# Patient Record
Sex: FEMALE | Race: WHITE | NOT HISPANIC OR LATINO | Employment: OTHER | URBAN - METROPOLITAN AREA
[De-identification: names, ages, dates, MRNs, and addresses within clinical notes are randomized per-mention and may not be internally consistent; named-entity substitution may affect disease eponyms.]

---

## 2017-07-06 DIAGNOSIS — Z00.00 ROUTINE GENERAL MEDICAL EXAMINATION AT A HEALTH CARE FACILITY: Primary | ICD-10-CM

## 2017-09-17 ENCOUNTER — RESULTS ENCOUNTER (OUTPATIENT)
Dept: FAMILY MEDICINE CLINIC | Facility: CLINIC | Age: 67
End: 2017-09-17

## 2017-09-17 DIAGNOSIS — Z00.00 ROUTINE GENERAL MEDICAL EXAMINATION AT A HEALTH CARE FACILITY: ICD-10-CM

## 2017-09-23 LAB
ALBUMIN SERPL-MCNC: 4.3 G/DL (ref 3.6–4.8)
ALBUMIN/GLOB SERPL: 1.7 {RATIO} (ref 1.2–2.2)
ALP SERPL-CCNC: 144 IU/L (ref 39–117)
ALT SERPL-CCNC: 67 IU/L (ref 0–32)
AST SERPL-CCNC: 48 IU/L (ref 0–40)
BILIRUB SERPL-MCNC: 0.2 MG/DL (ref 0–1.2)
BUN SERPL-MCNC: 17 MG/DL (ref 8–27)
BUN/CREAT SERPL: 27 (ref 12–28)
CALCIUM SERPL-MCNC: 9.1 MG/DL (ref 8.7–10.3)
CHLORIDE SERPL-SCNC: 104 MMOL/L (ref 96–106)
CHOLEST SERPL-MCNC: 182 MG/DL (ref 100–199)
CO2 SERPL-SCNC: 22 MMOL/L (ref 18–29)
CREAT SERPL-MCNC: 0.62 MG/DL (ref 0.57–1)
ERYTHROCYTE [DISTWIDTH] IN BLOOD BY AUTOMATED COUNT: 13.4 % (ref 12.3–15.4)
GLOBULIN SER CALC-MCNC: 2.5 G/DL (ref 1.5–4.5)
GLUCOSE SERPL-MCNC: 108 MG/DL (ref 65–99)
HCT VFR BLD AUTO: 41.6 % (ref 34–46.6)
HDLC SERPL-MCNC: 49 MG/DL
HGB BLD-MCNC: 14.2 G/DL (ref 11.1–15.9)
LDLC SERPL CALC-MCNC: 92 MG/DL (ref 0–99)
MCH RBC QN AUTO: 33.4 PG (ref 26.6–33)
MCHC RBC AUTO-ENTMCNC: 34.1 G/DL (ref 31.5–35.7)
MCV RBC AUTO: 98 FL (ref 79–97)
PLATELET # BLD AUTO: 162 X10E3/UL (ref 150–379)
POTASSIUM SERPL-SCNC: 4.4 MMOL/L (ref 3.5–5.2)
PROT SERPL-MCNC: 6.8 G/DL (ref 6–8.5)
RBC # BLD AUTO: 4.25 X10E6/UL (ref 3.77–5.28)
SODIUM SERPL-SCNC: 144 MMOL/L (ref 134–144)
TRIGL SERPL-MCNC: 206 MG/DL (ref 0–149)
VLDLC SERPL CALC-MCNC: 41 MG/DL (ref 5–40)
WBC # BLD AUTO: 9.4 X10E3/UL (ref 3.4–10.8)

## 2017-09-28 ENCOUNTER — OFFICE VISIT (OUTPATIENT)
Dept: FAMILY MEDICINE CLINIC | Facility: CLINIC | Age: 67
End: 2017-09-28

## 2017-09-28 VITALS
DIASTOLIC BLOOD PRESSURE: 86 MMHG | SYSTOLIC BLOOD PRESSURE: 156 MMHG | WEIGHT: 164 LBS | HEART RATE: 100 BPM | HEIGHT: 66 IN | OXYGEN SATURATION: 97 % | BODY MASS INDEX: 26.36 KG/M2

## 2017-09-28 DIAGNOSIS — Z00.00 ROUTINE GENERAL MEDICAL EXAMINATION AT HEALTH CARE FACILITY: Primary | ICD-10-CM

## 2017-09-28 DIAGNOSIS — Z12.11 COLON CANCER SCREENING: ICD-10-CM

## 2017-09-28 DIAGNOSIS — Z23 FLU VACCINE NEED: ICD-10-CM

## 2017-09-28 DIAGNOSIS — M76.61 ACHILLES TENDINITIS OF BOTH LOWER EXTREMITIES: ICD-10-CM

## 2017-09-28 DIAGNOSIS — R74.8 ELEVATED LIVER ENZYMES: ICD-10-CM

## 2017-09-28 DIAGNOSIS — M76.62 ACHILLES TENDINITIS OF BOTH LOWER EXTREMITIES: ICD-10-CM

## 2017-09-28 DIAGNOSIS — IMO0001 ELEVATED BLOOD PRESSURE: ICD-10-CM

## 2017-09-28 PROCEDURE — 99397 PER PM REEVAL EST PAT 65+ YR: CPT | Performed by: INTERNAL MEDICINE

## 2017-09-28 PROCEDURE — 90662 IIV NO PRSV INCREASED AG IM: CPT | Performed by: INTERNAL MEDICINE

## 2017-09-28 PROCEDURE — 90472 IMMUNIZATION ADMIN EACH ADD: CPT | Performed by: INTERNAL MEDICINE

## 2017-09-28 PROCEDURE — 90471 IMMUNIZATION ADMIN: CPT | Performed by: INTERNAL MEDICINE

## 2017-09-28 PROCEDURE — 90732 PPSV23 VACC 2 YRS+ SUBQ/IM: CPT | Performed by: INTERNAL MEDICINE

## 2017-09-28 PROCEDURE — 99214 OFFICE O/P EST MOD 30 MIN: CPT | Performed by: INTERNAL MEDICINE

## 2017-10-11 ENCOUNTER — HOSPITAL ENCOUNTER (OUTPATIENT)
Dept: ULTRASOUND IMAGING | Facility: HOSPITAL | Age: 67
Discharge: HOME | End: 2017-10-11
Attending: INTERNAL MEDICINE | Admitting: INTERNAL MEDICINE

## 2017-10-11 PROCEDURE — 76705 ECHO EXAM OF ABDOMEN: CPT

## 2017-10-12 LAB
ALBUMIN SERPL-MCNC: 4.5 G/DL (ref 3.6–4.8)
ALBUMIN/GLOB SERPL: 1.7 {RATIO} (ref 1.2–2.2)
ALP SERPL-CCNC: 105 IU/L (ref 39–117)
ALT SERPL-CCNC: 46 IU/L (ref 0–32)
AST SERPL-CCNC: 25 IU/L (ref 0–40)
BILIRUB SERPL-MCNC: 0.3 MG/DL (ref 0–1.2)
BUN SERPL-MCNC: 15 MG/DL (ref 8–27)
BUN/CREAT SERPL: 22 (ref 12–28)
CALCIUM SERPL-MCNC: 9.5 MG/DL (ref 8.7–10.3)
CHLORIDE SERPL-SCNC: 104 MMOL/L (ref 96–106)
CO2 SERPL-SCNC: 22 MMOL/L (ref 18–29)
CREAT SERPL-MCNC: 0.69 MG/DL (ref 0.57–1)
FERRITIN SERPL-MCNC: 398 NG/ML (ref 15–150)
GGT SERPL-CCNC: 274 IU/L (ref 0–60)
GLOBULIN SER CALC-MCNC: 2.7 G/DL (ref 1.5–4.5)
GLUCOSE SERPL-MCNC: 114 MG/DL (ref 65–99)
HBV CORE AB SERPL QL IA: NEGATIVE
HBV SURFACE AB SER QL: NON REACTIVE
HCV AB S/CO SERPL IA: 0.1 S/CO RATIO (ref 0–0.9)
IRON SATN MFR SERPL: 23 % (ref 15–55)
IRON SERPL-MCNC: 75 UG/DL (ref 27–139)
LKM-1 AB SER-ACNC: <1 UNITS (ref 0–20)
POTASSIUM SERPL-SCNC: 4.8 MMOL/L (ref 3.5–5.2)
PROT SERPL-MCNC: 7.2 G/DL (ref 6–8.5)
SODIUM SERPL-SCNC: 144 MMOL/L (ref 134–144)
TIBC SERPL-MCNC: 321 UG/DL (ref 250–450)
UIBC SERPL-MCNC: 246 UG/DL (ref 118–369)

## 2017-11-16 ENCOUNTER — OFFICE VISIT (OUTPATIENT)
Dept: FAMILY MEDICINE CLINIC | Facility: CLINIC | Age: 67
End: 2017-11-16

## 2017-11-16 VITALS
HEIGHT: 66 IN | OXYGEN SATURATION: 95 % | DIASTOLIC BLOOD PRESSURE: 84 MMHG | WEIGHT: 160.6 LBS | HEART RATE: 83 BPM | SYSTOLIC BLOOD PRESSURE: 120 MMHG | BODY MASS INDEX: 25.81 KG/M2

## 2017-11-16 DIAGNOSIS — K76.0 NAFLD (NONALCOHOLIC FATTY LIVER DISEASE): Primary | ICD-10-CM

## 2017-11-16 PROCEDURE — 99212 OFFICE O/P EST SF 10 MIN: CPT | Performed by: INTERNAL MEDICINE

## 2017-11-16 NOTE — PROGRESS NOTES
"Subjective   Antonia Mendez is a 67 y.o. female who presents today for:    Hypertension (7 week f/u) and Elevated Hepatic Enzymes (F/U Labs)    History of Present Illness   Liver enzymes and BP were elevated 2 months ago.  She has embarked on diet changes and increased exercise since then and dropped a few pounds already.    Liver U/S showed fatty liver disease.      Ms. Mendez  reports that she has been smoking Cigarettes.  She has never used smokeless tobacco. She reports that she drinks about 0.6 oz of alcohol per week  She reports that she does not use illicit drugs.     No Known Allergies    Current Outpatient Prescriptions:   •  B Complex Vitamins (VITAMIN-B COMPLEX PO), Take 1 tablet by mouth Daily., Disp: , Rfl:   •  Cholecalciferol (VITAMIN D PO), Take  by mouth Daily., Disp: , Rfl:   •  doxycycline (VIBRAMYCIN) 100 MG capsule, Take 1 capsule by mouth daily. (Patient taking differently: Take 100 mg by mouth Every Other Day.), Disp: 90 capsule, Rfl: 3      Review of Systems   Constitutional: Negative for unexpected weight change.   Cardiovascular: Negative for chest pain and leg swelling.       Objective   Vitals:    11/16/17 1130   BP: 120/84   BP Location: Left arm   Patient Position: Sitting   Cuff Size: Adult   Pulse: 83   SpO2: 95%   Weight: 160 lb 9.6 oz (72.8 kg)   Height: 66\" (167.6 cm)     Physical Exam  Anicteric.  In Good spirits.    Assessment/Plan   Antonia was seen today for hypertension and elevated hepatic enzymes.    Diagnoses and all orders for this visit:    NAFLD (nonalcoholic fatty liver disease)    Continue the therapeutic lifestyle changes that have her wait, her blood pressure, and her liver enzymes trending in the right direction  we will recheck her labs in 6 months, unless she needs to see us sooner.  "

## 2018-05-02 DIAGNOSIS — K76.0 NAFLD (NONALCOHOLIC FATTY LIVER DISEASE): Primary | ICD-10-CM

## 2018-05-02 DIAGNOSIS — R73.01 IFG (IMPAIRED FASTING GLUCOSE): ICD-10-CM

## 2018-05-06 ENCOUNTER — RESULTS ENCOUNTER (OUTPATIENT)
Dept: FAMILY MEDICINE CLINIC | Facility: CLINIC | Age: 68
End: 2018-05-06

## 2018-05-06 DIAGNOSIS — R73.01 IFG (IMPAIRED FASTING GLUCOSE): ICD-10-CM

## 2018-05-06 DIAGNOSIS — K76.0 NAFLD (NONALCOHOLIC FATTY LIVER DISEASE): ICD-10-CM

## 2018-05-10 LAB
ALBUMIN SERPL-MCNC: 4.5 G/DL (ref 3.6–4.8)
ALBUMIN/GLOB SERPL: 1.8 {RATIO} (ref 1.2–2.2)
ALP SERPL-CCNC: 106 IU/L (ref 39–117)
ALT SERPL-CCNC: 20 IU/L (ref 0–32)
AST SERPL-CCNC: 16 IU/L (ref 0–40)
BILIRUB SERPL-MCNC: 0.3 MG/DL (ref 0–1.2)
BUN SERPL-MCNC: 13 MG/DL (ref 8–27)
BUN/CREAT SERPL: 16 (ref 12–28)
CALCIUM SERPL-MCNC: 9.9 MG/DL (ref 8.7–10.3)
CHLORIDE SERPL-SCNC: 102 MMOL/L (ref 96–106)
CHOLEST SERPL-MCNC: 228 MG/DL (ref 100–199)
CO2 SERPL-SCNC: 23 MMOL/L (ref 18–29)
CREAT SERPL-MCNC: 0.81 MG/DL (ref 0.57–1)
FERRITIN SERPL-MCNC: 328 NG/ML (ref 15–150)
GFR SERPLBLD CREATININE-BSD FMLA CKD-EPI: 75 ML/MIN/1.73
GFR SERPLBLD CREATININE-BSD FMLA CKD-EPI: 87 ML/MIN/1.73
GGT SERPL-CCNC: 113 IU/L (ref 0–60)
GLOBULIN SER CALC-MCNC: 2.5 G/DL (ref 1.5–4.5)
GLUCOSE SERPL-MCNC: 102 MG/DL (ref 65–99)
HBA1C MFR BLD: 5.8 % (ref 4.8–5.6)
HDLC SERPL-MCNC: 60 MG/DL
LDLC SERPL CALC-MCNC: 134 MG/DL (ref 0–99)
POTASSIUM SERPL-SCNC: 4.3 MMOL/L (ref 3.5–5.2)
PROT SERPL-MCNC: 7 G/DL (ref 6–8.5)
SODIUM SERPL-SCNC: 142 MMOL/L (ref 134–144)
TRIGL SERPL-MCNC: 170 MG/DL (ref 0–149)
VLDLC SERPL CALC-MCNC: 34 MG/DL (ref 5–40)

## 2018-05-14 ENCOUNTER — OFFICE VISIT (OUTPATIENT)
Dept: FAMILY MEDICINE CLINIC | Facility: CLINIC | Age: 68
End: 2018-05-14

## 2018-05-14 VITALS
HEIGHT: 55 IN | HEART RATE: 82 BPM | OXYGEN SATURATION: 97 % | RESPIRATION RATE: 16 BRPM | BODY MASS INDEX: 35.89 KG/M2 | DIASTOLIC BLOOD PRESSURE: 70 MMHG | WEIGHT: 155.1 LBS | SYSTOLIC BLOOD PRESSURE: 122 MMHG

## 2018-05-14 DIAGNOSIS — E78.5 DYSLIPIDEMIA: ICD-10-CM

## 2018-05-14 DIAGNOSIS — R73.01 IFG (IMPAIRED FASTING GLUCOSE): ICD-10-CM

## 2018-05-14 DIAGNOSIS — K76.0 NAFLD (NONALCOHOLIC FATTY LIVER DISEASE): Primary | ICD-10-CM

## 2018-05-14 PROCEDURE — 99212 OFFICE O/P EST SF 10 MIN: CPT | Performed by: INTERNAL MEDICINE

## 2018-05-14 NOTE — PROGRESS NOTES
"Tremors and Follow-up (labs)    Diagnosed with NAFLD after Liver enzymes and BP were elevated in 9/2017.  She has embarked on diet changes and increased exercise since then and dropped a few pounds already.     Liver U/S showed fatty liver disease.      Blood sugar has been mildly elevated.  She has not reached the diabetic threshold.    No Known Allergies    Current Outpatient Prescriptions:   •  B Complex Vitamins (VITAMIN-B COMPLEX PO), Take 1 tablet by mouth Daily., Disp: , Rfl:   •  doxycycline (VIBRAMYCIN) 100 MG capsule, Take 1 capsule by mouth daily. (Patient taking differently: Take 100 mg by mouth Every Other Day.), Disp: 90 capsule, Rfl: 3  •  Milk Thistle 250 MG capsule, Take 1 capsule by mouth 2 (Two) Times a Day., Disp: , Rfl:   •  TURMERIC PO, Take 1 tablet by mouth 2 (Two) Times a Day., Disp: , Rfl:     She has cut back on her drinking, now only enjoying the occasional alcoholic beverage (max of 3 per week).    ROS:  Weight loss by design.  No abdominal pain.  No change in bowels.  No polyuria or polydipsia.  No myalgias.  She does have Achilles tendinitis and sacroiliac pain that have improved with the use of turmeric and her continued exercise.    /70 (BP Location: Right arm, Patient Position: Sitting, Cuff Size: Small Adult)   Pulse 82   Resp 16   Ht 72.8 cm (28.66\")   Wt 70.4 kg (155 lb 1.6 oz)   SpO2 97%   .75 kg/m²     EXAM:  Well-developed, well-nourished, in good spirits.  Sclerae are anicteric and the conjunctivae are pink.  No periorbital edema.  No carotid bruit.  No thyromegaly or mass.  Regular rate and rhythm.  No murmur.  Abdomen is soft, nondistended, nontender.  No evidence of hepatomegaly.  No masses appreciated.  No lower extremity edema.      Antonia was seen today for tremors and follow-up.    Diagnoses and all orders for this visit:    NAFLD (nonalcoholic fatty liver disease)    IFG (impaired fasting glucose)    Labs were reviewed with the patient today.  " Liver enzymes look much better.  GGT remains mildly elevated, but the rest are excellent.  Ferritin is trending down.  We will recheck these in 6 months.    A1c is slightly elevated at 5.8%.  Again, the diet changes that are helping her to lose weight and that are helping with her liver issues will also help with this.

## 2018-10-11 ENCOUNTER — RESULTS ENCOUNTER (OUTPATIENT)
Dept: FAMILY MEDICINE CLINIC | Facility: CLINIC | Age: 68
End: 2018-10-11

## 2018-10-11 DIAGNOSIS — K76.0 NAFLD (NONALCOHOLIC FATTY LIVER DISEASE): ICD-10-CM

## 2018-10-11 DIAGNOSIS — E78.5 DYSLIPIDEMIA: ICD-10-CM

## 2018-10-11 DIAGNOSIS — R73.01 IFG (IMPAIRED FASTING GLUCOSE): ICD-10-CM

## 2018-11-08 LAB
ALBUMIN SERPL-MCNC: 4.7 G/DL (ref 3.6–4.8)
ALBUMIN/GLOB SERPL: 1.9 {RATIO} (ref 1.2–2.2)
ALP SERPL-CCNC: 92 IU/L (ref 39–117)
ALT SERPL-CCNC: 18 IU/L (ref 0–32)
AST SERPL-CCNC: 18 IU/L (ref 0–40)
BILIRUB SERPL-MCNC: 0.4 MG/DL (ref 0–1.2)
BUN SERPL-MCNC: 19 MG/DL (ref 8–27)
BUN/CREAT SERPL: 24 (ref 12–28)
CALCIUM SERPL-MCNC: 9.9 MG/DL (ref 8.7–10.3)
CHLORIDE SERPL-SCNC: 107 MMOL/L (ref 96–106)
CHOLEST SERPL-MCNC: 232 MG/DL (ref 100–199)
CO2 SERPL-SCNC: 23 MMOL/L (ref 20–29)
CREAT SERPL-MCNC: 0.8 MG/DL (ref 0.57–1)
FERRITIN SERPL-MCNC: 279 NG/ML (ref 15–150)
GGT SERPL-CCNC: 106 IU/L (ref 0–60)
GLOBULIN SER CALC-MCNC: 2.5 G/DL (ref 1.5–4.5)
GLUCOSE SERPL-MCNC: 112 MG/DL (ref 65–99)
HBA1C MFR BLD: 5.6 % (ref 4.8–5.6)
HDLC SERPL-MCNC: 73 MG/DL
LDLC SERPL CALC-MCNC: 138 MG/DL (ref 0–99)
POTASSIUM SERPL-SCNC: 4.9 MMOL/L (ref 3.5–5.2)
PROT SERPL-MCNC: 7.2 G/DL (ref 6–8.5)
SODIUM SERPL-SCNC: 147 MMOL/L (ref 134–144)
TRIGL SERPL-MCNC: 107 MG/DL (ref 0–149)
VLDLC SERPL CALC-MCNC: 21 MG/DL (ref 5–40)

## 2018-11-14 ENCOUNTER — OFFICE VISIT (OUTPATIENT)
Dept: FAMILY MEDICINE CLINIC | Facility: CLINIC | Age: 68
End: 2018-11-14

## 2018-11-14 VITALS
OXYGEN SATURATION: 99 % | HEART RATE: 97 BPM | DIASTOLIC BLOOD PRESSURE: 80 MMHG | HEIGHT: 66 IN | SYSTOLIC BLOOD PRESSURE: 130 MMHG | BODY MASS INDEX: 24.93 KG/M2 | WEIGHT: 155.1 LBS

## 2018-11-14 DIAGNOSIS — Z23 FLU VACCINE NEED: ICD-10-CM

## 2018-11-14 DIAGNOSIS — D12.6 ADENOMATOUS POLYP OF COLON, UNSPECIFIED PART OF COLON: ICD-10-CM

## 2018-11-14 DIAGNOSIS — Z12.11 COLON CANCER SCREENING: ICD-10-CM

## 2018-11-14 DIAGNOSIS — K76.0 NAFLD (NONALCOHOLIC FATTY LIVER DISEASE): Primary | ICD-10-CM

## 2018-11-14 PROCEDURE — 99213 OFFICE O/P EST LOW 20 MIN: CPT | Performed by: INTERNAL MEDICINE

## 2018-11-14 PROCEDURE — 90662 IIV NO PRSV INCREASED AG IM: CPT | Performed by: INTERNAL MEDICINE

## 2018-11-14 PROCEDURE — G0008 ADMIN INFLUENZA VIRUS VAC: HCPCS | Performed by: INTERNAL MEDICINE

## 2018-11-14 NOTE — PROGRESS NOTES
"Subjective   Antonia Mendez is a 68 y.o. female who presents today for:    NAFLD (6 month f/u & review labs); Impaired Fasting Glucose; and Dyslipidemia    History of Present Illness     She was diagnosed with nonalcoholic fatty liver disease after her liver enzymes were elevated and a liver ultrasound showed fatty changes in 9/2017.  Her blood pressure was also elevated at that visit in 9/2017.  Additionally, blood sugar has been elevated but has not reached the diabetic threshold.  She dropped a few pounds through diet changes and increased exercise.     Ms. Mendez  reports that she has been smoking cigarettes.  she has never used smokeless tobacco. She reports that she drinks about 0.6 oz of alcohol per week. She reports that she does not use drugs.     No Known Allergies    Current Outpatient Medications:   •  B Complex Vitamins (VITAMIN-B COMPLEX PO), Take 1 tablet by mouth Daily., Disp: , Rfl:   •  doxycycline (VIBRAMYCIN) 100 MG capsule, Take 1 capsule by mouth daily. (Patient taking differently: Take 100 mg by mouth Every Other Day.), Disp: 90 capsule, Rfl: 3  •  Milk Thistle 250 MG capsule, Take 1 capsule by mouth 2 (Two) Times a Day., Disp: , Rfl:       Review of Systems   Constitutional: Negative for fatigue.   Eyes: Negative for visual disturbance.   Cardiovascular: Negative for chest pain.   Endocrine: Negative for polydipsia and polyuria.   Musculoskeletal: Negative for myalgias.   Skin: Negative for wound.   Neurological: Negative for numbness.          Objective   Vitals:    11/14/18 1021   BP: 130/80   BP Location: Left arm   Patient Position: Sitting   Cuff Size: Adult   Pulse: 97   SpO2: 99%   Weight: 70.4 kg (155 lb 1.6 oz)   Height: 167.6 cm (66\")     Physical Exam   Constitutional: She is oriented to person, place, and time. She appears well-developed and well-nourished. No distress.   Cardiovascular: Normal rate, regular rhythm and normal heart sounds.   Pulmonary/Chest: Effort normal and breath " sounds normal.   Abdominal: Soft. Bowel sounds are normal. There is no tenderness.   Neurological: She is alert and oriented to person, place, and time.   Psychiatric: She has a normal mood and affect.           Antonia was seen today for nafld, impaired fasting glucose and dyslipidemia.    Diagnoses and all orders for this visit:    NAFLD (nonalcoholic fatty liver disease)    Flu vaccine need  -     Fluzone High Dose =>65Years    Adenomatous polyp of colon, unspecified part of colon    Colon cancer screening  -     Ambulatory Referral to General Surgery    Liver enzymes are normal.  GGT continues to decline.  It was 274 in September, 2017; it is down to 106, less than 2 times the upper limit of normal.  She will continue with the diet and exercise that have her weight trending down and all of her numbers looking better.     Blood sugar remains elevated, but her triglycerides are excellent, and her A1c is normal at 5.6%.  Again, continued diet and exercise will help with this.

## 2019-04-22 ENCOUNTER — OUTSIDE FACILITY SERVICE (OUTPATIENT)
Dept: SURGERY | Facility: CLINIC | Age: 69
End: 2019-04-22

## 2019-04-22 PROCEDURE — 45378 DIAGNOSTIC COLONOSCOPY: CPT | Performed by: SURGERY

## 2019-05-06 DIAGNOSIS — K76.0 NAFLD (NONALCOHOLIC FATTY LIVER DISEASE): Primary | ICD-10-CM

## 2019-05-06 DIAGNOSIS — R73.01 IFG (IMPAIRED FASTING GLUCOSE): ICD-10-CM

## 2019-05-06 DIAGNOSIS — E78.5 DYSLIPIDEMIA: ICD-10-CM

## 2019-05-08 LAB
ALBUMIN SERPL-MCNC: 4.6 G/DL (ref 3.6–4.8)
ALBUMIN/GLOB SERPL: 1.6 {RATIO} (ref 1.2–2.2)
ALP SERPL-CCNC: 97 IU/L (ref 39–117)
ALT SERPL-CCNC: 20 IU/L (ref 0–32)
AST SERPL-CCNC: 21 IU/L (ref 0–40)
BILIRUB SERPL-MCNC: 0.3 MG/DL (ref 0–1.2)
BUN SERPL-MCNC: 24 MG/DL (ref 8–27)
BUN/CREAT SERPL: 27 (ref 12–28)
CALCIUM SERPL-MCNC: 10.2 MG/DL (ref 8.7–10.3)
CHLORIDE SERPL-SCNC: 104 MMOL/L (ref 96–106)
CHOLEST SERPL-MCNC: 269 MG/DL (ref 100–199)
CO2 SERPL-SCNC: 21 MMOL/L (ref 20–29)
CREAT SERPL-MCNC: 0.88 MG/DL (ref 0.57–1)
FERRITIN SERPL-MCNC: 305 NG/ML (ref 15–150)
GGT SERPL-CCNC: 97 IU/L (ref 0–60)
GLOBULIN SER CALC-MCNC: 2.8 G/DL (ref 1.5–4.5)
GLUCOSE SERPL-MCNC: 106 MG/DL (ref 65–99)
HBA1C MFR BLD: 5.5 % (ref 4.8–5.6)
HDLC SERPL-MCNC: 67 MG/DL
LDLC SERPL CALC-MCNC: 168 MG/DL (ref 0–99)
POTASSIUM SERPL-SCNC: 4.6 MMOL/L (ref 3.5–5.2)
PROT SERPL-MCNC: 7.4 G/DL (ref 6–8.5)
SODIUM SERPL-SCNC: 144 MMOL/L (ref 134–144)
TRIGL SERPL-MCNC: 169 MG/DL (ref 0–149)
VLDLC SERPL CALC-MCNC: 34 MG/DL (ref 5–40)

## 2019-05-14 ENCOUNTER — OFFICE VISIT (OUTPATIENT)
Dept: FAMILY MEDICINE CLINIC | Facility: CLINIC | Age: 69
End: 2019-05-14

## 2019-05-14 VITALS
TEMPERATURE: 97.6 F | WEIGHT: 152.8 LBS | DIASTOLIC BLOOD PRESSURE: 78 MMHG | OXYGEN SATURATION: 98 % | BODY MASS INDEX: 24.56 KG/M2 | HEIGHT: 66 IN | HEART RATE: 89 BPM | RESPIRATION RATE: 18 BRPM | SYSTOLIC BLOOD PRESSURE: 126 MMHG

## 2019-05-14 DIAGNOSIS — K76.0 NAFLD (NONALCOHOLIC FATTY LIVER DISEASE): ICD-10-CM

## 2019-05-14 DIAGNOSIS — E78.2 MIXED HYPERLIPIDEMIA: ICD-10-CM

## 2019-05-14 DIAGNOSIS — Z00.00 MEDICARE ANNUAL WELLNESS VISIT, SUBSEQUENT: Primary | ICD-10-CM

## 2019-05-14 DIAGNOSIS — R79.89 ELEVATED FERRITIN: ICD-10-CM

## 2019-05-14 PROCEDURE — G0439 PPPS, SUBSEQ VISIT: HCPCS | Performed by: NURSE PRACTITIONER

## 2019-05-14 NOTE — PROGRESS NOTES
QUICK REFERENCE INFORMATION:  The ABCs of the Annual Wellness Visit    Subsequent Medicare Wellness Visit     HEALTH RISK ASSESSMENT    : 1950    Recent Hospitalizations:  No hospitalization(s) within the last year..  ccc      Current Medical Providers:  Patient Care Team:  Rose Hermosillo APRN as PCP - General (Family Medicine)  Iris Weiner MD as Consulting Physician (Gynecology)  Tennille Mendoza MD as Surgeon (General Surgery)        Smoking Status:  Social History     Tobacco Use   Smoking Status Current Every Day Smoker   • Types: Cigarettes   Smokeless Tobacco Never Used       Alcohol Consumption:  Social History     Substance and Sexual Activity   Alcohol Use Yes   • Alcohol/week: 0.6 oz   • Types: 1 Glasses of wine per week       Depression Screen:   PHQ-2/PHQ-9 Depression Screening 2019   Little interest or pleasure in doing things 0   Feeling down, depressed, or hopeless 0   Total Score 0       Health Habits and Functional and Cognitive Screening:  Functional & Cognitive Status 2019   Do you have difficulty preparing food and eating? No   Do you have difficulty bathing yourself, getting dressed or grooming yourself? No   Do you have difficulty using the toilet? No   Do you have difficulty moving around from place to place? No   Do you have trouble with steps or getting out of a bed or a chair? No   In the past year have you fallen or experienced a near fall? No   Current Diet Well Balanced Diet   Dental Exam Up to date   Eye Exam Up to date   Exercise (times per week) 3 times per week   Current Exercise Activities Include Cardiovasular Workout on Exercise Equipment   Do you need help using the phone?  No   Are you deaf or do you have serious difficulty hearing?  No   Do you need help with transportation? No   Do you need help shopping? No   Do you need help preparing meals?  No   Do you need help with housework?  No   Do you need help with laundry? No   Do you need help taking your  medications? No   Do you need help managing money? No   Do you ever drive or ride in a car without wearing a seat belt? No   Have you felt unusual stress, anger or loneliness in the last month? No   Who do you live with? Spouse   If you need help, do you have trouble finding someone available to you? No   Have you been bothered in the last four weeks by sexual problems? No   Do you have difficulty concentrating, remembering or making decisions? No           Does the patient have evidence of cognitive impairment? No    Asiprin use counseling: Does not need ASA (and currently is not on it)      Recent Lab Results:    Lab Results   Component Value Date     (H) 05/07/2019     Lab Results   Component Value Date    HGBA1C 5.5 05/07/2019     Lab Results   Component Value Date    TRIG 169 (H) 05/07/2019    HDL 67 05/07/2019    VLDL 34 05/07/2019           Age-appropriate Screening Schedule:  Refer to the list below for future screening recommendations based on patient's age, sex and/or medical conditions. Orders for these recommended tests are listed in the plan section. The patient has been provided with a written plan.    Health Maintenance   Topic Date Due   • ZOSTER VACCINE (2 of 3) 09/15/2015   • MAMMOGRAM  08/04/2016   • INFLUENZA VACCINE  08/01/2019   • TDAP/TD VACCINES (2 - Td) 12/02/2023   • COLONOSCOPY  04/22/2029   • PNEUMOCOCCAL VACCINES (65+ LOW/MEDIUM RISK)  Completed        Subjective   History of Present Illness    Antonia Mendez is a 68 y.o. female who presents for an Annual Wellness Visit.    The following portions of the patient's history were reviewed and updated as appropriate: allergies, current medications, past family history, past medical history, past social history, past surgical history and problem list.    Outpatient Medications Prior to Visit   Medication Sig Dispense Refill   • B Complex Vitamins (VITAMIN-B COMPLEX PO) Take 1 tablet by mouth Daily.     • doxycycline (VIBRAMYCIN) 100 MG  "capsule Take 1 capsule by mouth daily. (Patient taking differently: Take 100 mg by mouth Every Other Day.) 90 capsule 3   • Fish Oil-Cholecalciferol (OMEGA-3 + D PO) Take  by mouth.     • Milk Thistle 250 MG capsule Take 1 capsule by mouth 2 (Two) Times a Day.       No facility-administered medications prior to visit.        Patient Active Problem List   Diagnosis   • Essential tremor   • Tobacco abuse   • NAFLD (nonalcoholic fatty liver disease)   • Adenomatous polyp of colon       Advance Care Planning:  Patient does not have an advance directive - information provided to the patient today    Identification of Risk Factors:  Risk factors include: cardiovascular risk.    Review of Systems   Constitutional: Negative.    Respiratory: Negative.    Cardiovascular: Negative.    Gastrointestinal: Negative.    Endocrine: Negative.    Musculoskeletal: Negative.    Skin: Negative.    Neurological: Negative.    All other systems reviewed and are negative.      Compared to one year ago, the patient feels her physical health is better.  Compared to one year ago, the patient feels her mental health is the same.    Objective     Physical Exam   Constitutional: She is oriented to person, place, and time. She appears well-nourished.   Neurological: She is alert and oriented to person, place, and time.   Skin: Skin is warm and dry.   Psychiatric:   No acute distress   Vitals reviewed.      Vitals:    05/14/19 1329   BP: 126/78   BP Location: Right arm   Patient Position: Sitting   Cuff Size: Adult   Pulse: 89   Resp: 18   Temp: 97.6 °F (36.4 °C)   TempSrc: Oral   SpO2: 98%   Weight: 69.3 kg (152 lb 12.8 oz)   Height: 167.6 cm (66\")       Patient's Body mass index is 24.66 kg/m². BMI is within normal parameters. No follow-up required..      Assessment/Plan   Patient Self-Management and Personalized Health Advice  The patient has been provided with information about: prevention of cardiac or vascular disease and preventive services " including:   · Advance directive.    Visit Diagnoses:  No diagnosis found.    No orders of the defined types were placed in this encounter.      Outpatient Encounter Medications as of 5/14/2019   Medication Sig Dispense Refill   • B Complex Vitamins (VITAMIN-B COMPLEX PO) Take 1 tablet by mouth Daily.     • doxycycline (VIBRAMYCIN) 100 MG capsule Take 1 capsule by mouth daily. (Patient taking differently: Take 100 mg by mouth Every Other Day.) 90 capsule 3   • Fish Oil-Cholecalciferol (OMEGA-3 + D PO) Take  by mouth.     • Milk Thistle 250 MG capsule Take 1 capsule by mouth 2 (Two) Times a Day.       No facility-administered encounter medications on file as of 5/14/2019.        Reviewed use of high risk medication in the elderly: yes  Reviewed for potential of harmful drug interactions in the elderly: yes    Follow Up:  No Follow-up on file.     An After Visit Summary and PPPS with all of these plans were given to the patient.

## 2019-05-14 NOTE — PATIENT INSTRUCTIONS
Medicare Wellness  Personal Prevention Plan of Service     Date of Office Visit:  2019  Encounter Provider:  ANNELISE Deluca  Place of Service:  [facility] INTERNAL MEDICINE  Patient Name: Antonia Mendez  :  1950    As part of the Medicare Wellness portion of your visit today, we are providing you with this personalized preventive plan of services (PPPS). This plan is based upon recommendations of the [place] Preventive Services Task Force (USPSTF) and the Advisory Committee on Immunization Practices (ACIP).    This lists the preventive care services that should be considered, and provides dates of when you are due. Items listed as completed are up-to-date and do not require any further intervention.    Health Maintenance   Topic Date Due   • ZOSTER VACCINE (2 of 3) 09/15/2015   • ANNUAL PHYSICAL  2018   • INFLUENZA VACCINE  2019   • MAMMOGRAM  2021   • TDAP/TD VACCINES (2 - Td) 2023   • COLONOSCOPY  2029   • HEPATITIS C SCREENING  Completed   • PNEUMOCOCCAL VACCINES (65+ LOW/MEDIUM RISK)  Completed       No orders of the defined types were placed in this encounter.      No Follow-up on file.

## 2019-11-01 ENCOUNTER — RESULTS ENCOUNTER (OUTPATIENT)
Dept: FAMILY MEDICINE CLINIC | Facility: CLINIC | Age: 69
End: 2019-11-01

## 2019-11-01 DIAGNOSIS — E78.2 MIXED HYPERLIPIDEMIA: ICD-10-CM

## 2019-11-01 DIAGNOSIS — R79.89 ELEVATED FERRITIN: ICD-10-CM

## 2019-11-01 DIAGNOSIS — K76.0 NAFLD (NONALCOHOLIC FATTY LIVER DISEASE): ICD-10-CM

## 2019-11-06 LAB
ALBUMIN SERPL-MCNC: 4.7 G/DL (ref 3.6–4.8)
ALBUMIN/GLOB SERPL: 2.1 {RATIO} (ref 1.2–2.2)
ALP SERPL-CCNC: 95 IU/L (ref 39–117)
ALT SERPL-CCNC: 21 IU/L (ref 0–32)
AST SERPL-CCNC: 22 IU/L (ref 0–40)
BILIRUB SERPL-MCNC: 0.3 MG/DL (ref 0–1.2)
BUN SERPL-MCNC: 12 MG/DL (ref 8–27)
BUN/CREAT SERPL: 13 (ref 12–28)
CALCIUM SERPL-MCNC: 10 MG/DL (ref 8.7–10.3)
CHLORIDE SERPL-SCNC: 101 MMOL/L (ref 96–106)
CHOLEST SERPL-MCNC: 236 MG/DL (ref 100–199)
CHOLEST/HDLC SERPL: 3.9 RATIO (ref 0–4.4)
CO2 SERPL-SCNC: 23 MMOL/L (ref 20–29)
CREAT SERPL-MCNC: 0.89 MG/DL (ref 0.57–1)
FERRITIN SERPL-MCNC: 453 NG/ML (ref 15–150)
GGT SERPL-CCNC: 126 IU/L (ref 0–60)
GLOBULIN SER CALC-MCNC: 2.2 G/DL (ref 1.5–4.5)
GLUCOSE SERPL-MCNC: 114 MG/DL (ref 65–99)
HDLC SERPL-MCNC: 61 MG/DL
LDLC SERPL CALC-MCNC: 144 MG/DL (ref 0–99)
POTASSIUM SERPL-SCNC: 4.8 MMOL/L (ref 3.5–5.2)
PROT SERPL-MCNC: 6.9 G/DL (ref 6–8.5)
SODIUM SERPL-SCNC: 141 MMOL/L (ref 134–144)
TRIGL SERPL-MCNC: 154 MG/DL (ref 0–149)
VLDLC SERPL CALC-MCNC: 31 MG/DL (ref 5–40)

## 2019-11-12 ENCOUNTER — OFFICE VISIT (OUTPATIENT)
Dept: FAMILY MEDICINE CLINIC | Facility: CLINIC | Age: 69
End: 2019-11-12

## 2019-11-12 VITALS
RESPIRATION RATE: 18 BRPM | WEIGHT: 144.2 LBS | OXYGEN SATURATION: 98 % | TEMPERATURE: 97.4 F | HEIGHT: 66 IN | BODY MASS INDEX: 23.18 KG/M2 | HEART RATE: 95 BPM | SYSTOLIC BLOOD PRESSURE: 122 MMHG | DIASTOLIC BLOOD PRESSURE: 78 MMHG

## 2019-11-12 DIAGNOSIS — L71.9 ACNE ROSACEA: ICD-10-CM

## 2019-11-12 DIAGNOSIS — K76.0 NAFLD (NONALCOHOLIC FATTY LIVER DISEASE): Primary | ICD-10-CM

## 2019-11-12 PROCEDURE — 99213 OFFICE O/P EST LOW 20 MIN: CPT | Performed by: NURSE PRACTITIONER

## 2019-11-12 RX ORDER — DOXYCYCLINE HYCLATE 100 MG/1
100 CAPSULE ORAL EVERY OTHER DAY
Qty: 90 CAPSULE | Refills: 3 | Status: SHIPPED | OUTPATIENT
Start: 2019-11-12 | End: 2023-01-24 | Stop reason: SDUPTHER

## 2019-11-12 RX ORDER — INFLUENZA A VIRUS A/MICHIGAN/45/2015 X-275 (H1N1) ANTIGEN (FORMALDEHYDE INACTIVATED), INFLUENZA A VIRUS A/SINGAPORE/INFIMH-16-0019/2016 IVR-186 (H3N2) ANTIGEN (FORMALDEHYDE INACTIVATED), AND INFLUENZA B VIRUS B/MARYLAND/15/2016 BX-69A (A B/COLORADO/6/2017-LIKE VIRUS) ANTIGEN (FORMALDEHYDE INACTIVATED) 60; 60; 60 UG/.5ML; UG/.5ML; UG/.5ML
INJECTION, SUSPENSION INTRAMUSCULAR
Refills: 0 | COMMUNITY
Start: 2019-10-09 | End: 2020-07-15

## 2019-11-12 NOTE — PROGRESS NOTES
"Subjective   Antonia Mendez is a 69 y.o. female.     Chief Complaint   Patient presents with   • NAFLD     F/u     Ms. Mendze presents today to follow up on her liver enzymes. She denies abdominal pain. She voices no other concerns today.      I have reviewed the patient's medical history in detail and updated the computerized patient record.    The following portions of the patient's history were reviewed and updated as appropriate: allergies, current medications, past family history, past medical history, past social history, past surgical history and problem list.       Current Outpatient Medications:   •  B Complex Vitamins (VITAMIN-B COMPLEX PO), Take 1 tablet by mouth Daily., Disp: , Rfl:   •  doxycycline (VIBRAMYCIN) 100 MG capsule, Take 1 capsule by mouth daily. (Patient taking differently: Take 100 mg by mouth Every Other Day.), Disp: 90 capsule, Rfl: 3  •  Fish Oil-Cholecalciferol (OMEGA-3 + D PO), Take  by mouth., Disp: , Rfl:   •  FLUZONE HIGH-DOSE 0.5 ML suspension prefilled syringe injection, inject 0.5 milliliters intramuscularly, Disp: , Rfl: 0  •  Milk Thistle 250 MG capsule, Take 1 capsule by mouth 2 (Two) Times a Day., Disp: , Rfl:     Review of Systems   Constitutional: Negative.    Respiratory: Negative.    Cardiovascular: Negative.    Gastrointestinal: Positive for constipation (chronic).   Musculoskeletal: Negative.    Skin: Negative.    Neurological: Negative.    Psychiatric/Behavioral: Negative.         Vitals:    11/12/19 1138   BP: 122/78   BP Location: Left arm   Patient Position: Sitting   Cuff Size: Adult   Pulse: 95   Resp: 18   Temp: 97.4 °F (36.3 °C)   TempSrc: Oral   SpO2: 98%   Weight: 65.4 kg (144 lb 3.2 oz)   Height: 167.6 cm (66\")       Objective   Physical Exam   Constitutional: She appears well-developed and well-nourished.   Cardiovascular: Normal rate, regular rhythm, normal heart sounds and intact distal pulses.   Pulmonary/Chest: Effort normal and breath sounds normal. "   Abdominal: Soft. Bowel sounds are normal.   Musculoskeletal: Normal range of motion. She exhibits no edema.   Skin: Skin is warm and dry.   Psychiatric:   No acute distress   Vitals reviewed.        Assessment/Plan   Antonia was seen today for nafld.    Diagnoses and all orders for this visit:    NAFLD (nonalcoholic fatty liver disease)    Acne rosacea      Ms. Mendez presents today to follow up on NAFLD. I have reviewed her labs with her today.   Her liver enzymes are normal, gamma GT is still elevated along with her Ferritin level.  Her lipid levels are also elevated.   We discussed that she needs to follow a low fat and low carbohydrate diet to lower her lipid levels and she is to not drink alcohol.  She is going on a 4 month trip around the world after the first of the year and is to follow up when she returns.

## 2020-06-30 DIAGNOSIS — R73.01 IFG (IMPAIRED FASTING GLUCOSE): ICD-10-CM

## 2020-06-30 DIAGNOSIS — E78.5 DYSLIPIDEMIA: ICD-10-CM

## 2020-06-30 DIAGNOSIS — K76.0 NAFLD (NONALCOHOLIC FATTY LIVER DISEASE): Primary | ICD-10-CM

## 2020-07-05 ENCOUNTER — RESULTS ENCOUNTER (OUTPATIENT)
Dept: FAMILY MEDICINE CLINIC | Facility: CLINIC | Age: 70
End: 2020-07-05

## 2020-07-05 DIAGNOSIS — E78.5 DYSLIPIDEMIA: ICD-10-CM

## 2020-07-05 DIAGNOSIS — R73.01 IFG (IMPAIRED FASTING GLUCOSE): ICD-10-CM

## 2020-07-05 DIAGNOSIS — K76.0 NAFLD (NONALCOHOLIC FATTY LIVER DISEASE): ICD-10-CM

## 2020-07-11 LAB
ALBUMIN SERPL-MCNC: 4.6 G/DL (ref 3.8–4.8)
ALBUMIN/GLOB SERPL: 1.8 {RATIO} (ref 1.2–2.2)
ALP SERPL-CCNC: 95 IU/L (ref 39–117)
ALT SERPL-CCNC: 19 IU/L (ref 0–32)
AST SERPL-CCNC: 18 IU/L (ref 0–40)
BILIRUB SERPL-MCNC: 0.3 MG/DL (ref 0–1.2)
BUN SERPL-MCNC: 18 MG/DL (ref 8–27)
BUN/CREAT SERPL: 21 (ref 12–28)
CALCIUM SERPL-MCNC: 9.9 MG/DL (ref 8.7–10.3)
CHLORIDE SERPL-SCNC: 107 MMOL/L (ref 96–106)
CHOLEST SERPL-MCNC: 230 MG/DL (ref 100–199)
CO2 SERPL-SCNC: 25 MMOL/L (ref 20–29)
CREAT SERPL-MCNC: 0.86 MG/DL (ref 0.57–1)
FERRITIN SERPL-MCNC: 291 NG/ML (ref 15–150)
GGT SERPL-CCNC: 97 IU/L (ref 0–60)
GLOBULIN SER CALC-MCNC: 2.6 G/DL (ref 1.5–4.5)
GLUCOSE SERPL-MCNC: 114 MG/DL (ref 65–99)
HDLC SERPL-MCNC: 78 MG/DL
LDLC SERPL CALC-MCNC: 134 MG/DL (ref 0–99)
POTASSIUM SERPL-SCNC: 5.1 MMOL/L (ref 3.5–5.2)
PROT SERPL-MCNC: 7.2 G/DL (ref 6–8.5)
SODIUM SERPL-SCNC: 144 MMOL/L (ref 134–144)
TRIGL SERPL-MCNC: 89 MG/DL (ref 0–149)
VLDLC SERPL CALC-MCNC: 18 MG/DL (ref 5–40)

## 2020-07-15 ENCOUNTER — OFFICE VISIT (OUTPATIENT)
Dept: FAMILY MEDICINE CLINIC | Facility: CLINIC | Age: 70
End: 2020-07-15

## 2020-07-15 VITALS
DIASTOLIC BLOOD PRESSURE: 74 MMHG | HEIGHT: 66 IN | OXYGEN SATURATION: 99 % | TEMPERATURE: 98.5 F | WEIGHT: 156 LBS | SYSTOLIC BLOOD PRESSURE: 120 MMHG | HEART RATE: 80 BPM | BODY MASS INDEX: 25.07 KG/M2

## 2020-07-15 DIAGNOSIS — K76.0 NAFLD (NONALCOHOLIC FATTY LIVER DISEASE): Primary | ICD-10-CM

## 2020-07-15 PROCEDURE — 99213 OFFICE O/P EST LOW 20 MIN: CPT | Performed by: NURSE PRACTITIONER

## 2020-07-15 RX ORDER — MOMETASONE FUROATE 1 MG/G
CREAM TOPICAL
COMMUNITY
Start: 2020-06-10 | End: 2020-08-05

## 2020-07-15 NOTE — PROGRESS NOTES
Subjective   Antonia Mendez is a 69 y.o. female.     Chief Complaint   Patient presents with   • Results   • NAFLD     Ms. Mendez presents today because she wanted to have labs done to follow up on her fatty liver disease. She did not want to wait until August when she has a new patient appointment with Dr. Barnard to have her labs done.        I have reviewed the patient's medical history in detail and updated the computerized patient record.    The following portions of the patient's history were reviewed and updated as appropriate: allergies, current medications, past family history, past medical history, past social history, past surgical history and problem list.      Current Outpatient Medications:   •  doxycycline (VIBRAMYCIN) 100 MG capsule, Take 1 capsule by mouth Every Other Day., Disp: 90 capsule, Rfl: 3  •  mometasone (ELOCON) 0.1 % cream, , Disp: , Rfl:   •  mupirocin (BACTROBAN) 2 % ointment, , Disp: , Rfl:   •  B Complex Vitamins (VITAMIN-B COMPLEX PO), Take 1 tablet by mouth Daily., Disp: , Rfl:   •  Fish Oil-Cholecalciferol (OMEGA-3 + D PO), Take  by mouth., Disp: , Rfl:   •  Milk Thistle 250 MG capsule, Take 1 capsule by mouth 2 (Two) Times a Day., Disp: , Rfl:      Review of Systems   Constitutional: Negative.    Respiratory: Negative.    Cardiovascular: Negative.    Musculoskeletal: Negative.    Skin: Negative.    Neurological: Negative.    Psychiatric/Behavioral: Negative.        Objective    Vitals:    07/15/20 1456   BP: 120/74   Pulse: 80   Temp: 98.5 °F (36.9 °C)   SpO2: 99%     Physical Exam   Constitutional: She is oriented to person, place, and time. She appears well-developed and well-nourished.   Cardiovascular: Normal rate, regular rhythm, normal heart sounds and intact distal pulses.   Pulmonary/Chest: Effort normal and breath sounds normal.   Neurological: She is alert and oriented to person, place, and time.   Skin: Skin is warm and dry.   Psychiatric:   No acute distress   Vitals  reviewed.        Assessment/Plan   Antonia was seen today for results and nafld.    Diagnoses and all orders for this visit:    NAFLD (nonalcoholic fatty liver disease)      Ms. Mendez presents today to review her labs that are associated with NAFLD.   I have reviewed her labs with her today.   Her AST and ALT are normal.  Her GGT is 97 which is down from 126 last November and her ferritin is 291 which is down from 453.   Her lipid levels have decreased a little and her fasting glucose is slightly elevated.   She is to follow up with Dr. Barnard as scheduled in August.

## 2020-08-05 ENCOUNTER — OFFICE VISIT (OUTPATIENT)
Dept: FAMILY MEDICINE CLINIC | Facility: CLINIC | Age: 70
End: 2020-08-05

## 2020-08-05 VITALS
SYSTOLIC BLOOD PRESSURE: 122 MMHG | RESPIRATION RATE: 18 BRPM | DIASTOLIC BLOOD PRESSURE: 70 MMHG | TEMPERATURE: 97.5 F | OXYGEN SATURATION: 99 % | BODY MASS INDEX: 25.46 KG/M2 | HEART RATE: 67 BPM | HEIGHT: 66 IN | WEIGHT: 158.4 LBS

## 2020-08-05 DIAGNOSIS — K76.0 NAFLD (NONALCOHOLIC FATTY LIVER DISEASE): Primary | ICD-10-CM

## 2020-08-05 PROCEDURE — 99214 OFFICE O/P EST MOD 30 MIN: CPT | Performed by: INTERNAL MEDICINE

## 2020-08-05 NOTE — PROGRESS NOTES
Subjective   Antonia Mendez is a 70 y.o. female. Patient is here today for   Chief Complaint   Patient presents with   • Establish Care   • Med Management          Vitals:    08/05/20 0903   BP: 122/70   Pulse: 67   Resp: 18   Temp: 97.5 °F (36.4 °C)   SpO2: 99%     Body mass index is 25.58 kg/m².      Past Medical History:   Diagnosis Date   • Essential tremor 9/26/2016   • NAFLD (nonalcoholic fatty liver disease) 11/16/2017    10/2017 liver U/S   • Tobacco abuse 9/26/2016      No Known Allergies   Social History     Socioeconomic History   • Marital status:      Spouse name: Not on file   • Number of children: Not on file   • Years of education: Not on file   • Highest education level: Not on file   Tobacco Use   • Smoking status: Current Every Day Smoker     Types: Electronic Cigarette   • Smokeless tobacco: Never Used   Substance and Sexual Activity   • Alcohol use: Yes     Alcohol/week: 1.0 standard drinks     Types: 1 Glasses of wine per week   • Drug use: No        Current Outpatient Medications:   •  B Complex Vitamins (VITAMIN-B COMPLEX PO), Take 1 tablet by mouth Daily., Disp: , Rfl:   •  doxycycline (VIBRAMYCIN) 100 MG capsule, Take 1 capsule by mouth Every Other Day., Disp: 90 capsule, Rfl: 3  •  Fish Oil-Cholecalciferol (OMEGA-3 + D PO), Take  by mouth., Disp: , Rfl:   •  Milk Thistle 250 MG capsule, Take 1 capsule by mouth 2 (Two) Times a Day., Disp: , Rfl:   •  mometasone (ELOCON) 0.1 % cream, , Disp: , Rfl:   •  mupirocin (BACTROBAN) 2 % ointment, , Disp: , Rfl:      Objective     This patient is here to meet me for the first time.  She used to follow-up with  who is moved his practice to the [facility].    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  She has a history of nonalcoholic fatty liver disease.  She drinks alcohol infrequently.    She sees Dr. Terence Ruiz of dermatology for acne rosacea.  Her only prescription medication is for doxycycline for this issue.    Given her age,  she realizes that she is at an elevated risk for COVID 19 disease.  She has a membership at [facility].  She does not feel safe going there and requested a note from me explaining that I have recommended that she avoid this place for the duration of the COVID pandemic.    She used to smoke cigarettes and now she vapes.    She tells me that she feels well.       Review of Systems   Constitutional: Negative.    HENT: Negative.    Cardiovascular: Negative.    Musculoskeletal: Negative.    Psychiatric/Behavioral: Negative.        Physical Exam   Constitutional: She is oriented to person, place, and time. She appears well-developed and well-nourished.   Pleasant, neatly groomed, in no distress.   HENT:   Head: Normocephalic and atraumatic.   Neck:   No carotid bruit.   Cardiovascular: Normal rate, regular rhythm and normal heart sounds.   Pulmonary/Chest: Effort normal and breath sounds normal.   Neurological: She is alert and oriented to person, place, and time.   Psychiatric: She has a normal mood and affect. Her behavior is normal. Thought content normal.   Nursing note and vitals reviewed.        Problem List Items Addressed This Visit        Digestive    NAFLD (nonalcoholic fatty liver disease) - Primary            PLAN  She and I reviewed her labs.  She had lab work done this past June.  All seems well.    I did give her handout on vascular screening.    Would like to see her back for a comprehensive physical exam or a Medicare wellness visit in about 6 months.    Labs prior to that visit should include: Lipid profile, comprehensive metabolic panel, CBC, urinalysis.    Incidentally, she has an identical twin sister who is going to see Dr. Tejada of gastroenterology regarding her elevated liver enzymes.  No follow-ups on file.

## 2021-05-12 DIAGNOSIS — Z00.00 ROUTINE HEALTH MAINTENANCE: Primary | ICD-10-CM

## 2021-05-14 LAB
ALBUMIN SERPL-MCNC: 4.7 G/DL (ref 3.8–4.8)
ALBUMIN/GLOB SERPL: 1.9 {RATIO} (ref 1.2–2.2)
ALP SERPL-CCNC: 80 IU/L (ref 39–117)
ALT SERPL-CCNC: 22 IU/L (ref 0–32)
APPEARANCE UR: CLEAR
AST SERPL-CCNC: 19 IU/L (ref 0–40)
BACTERIA #/AREA URNS HPF: ABNORMAL /[HPF]
BASOPHILS # BLD AUTO: 0.1 X10E3/UL (ref 0–0.2)
BASOPHILS NFR BLD AUTO: 1 %
BILIRUB SERPL-MCNC: 0.4 MG/DL (ref 0–1.2)
BILIRUB UR QL STRIP: NEGATIVE
BUN SERPL-MCNC: 15 MG/DL (ref 8–27)
BUN/CREAT SERPL: 18 (ref 12–28)
CALCIUM SERPL-MCNC: 9.6 MG/DL (ref 8.7–10.3)
CASTS URNS QL MICRO: ABNORMAL /LPF
CHLORIDE SERPL-SCNC: 105 MMOL/L (ref 96–106)
CHOLEST SERPL-MCNC: 223 MG/DL (ref 100–199)
CO2 SERPL-SCNC: 24 MMOL/L (ref 20–29)
COLOR UR: YELLOW
CREAT SERPL-MCNC: 0.82 MG/DL (ref 0.57–1)
EOSINOPHIL # BLD AUTO: 0.1 X10E3/UL (ref 0–0.4)
EOSINOPHIL NFR BLD AUTO: 1 %
EPI CELLS #/AREA URNS HPF: >10 /HPF (ref 0–10)
ERYTHROCYTE [DISTWIDTH] IN BLOOD BY AUTOMATED COUNT: 12.2 % (ref 11.7–15.4)
GLOBULIN SER CALC-MCNC: 2.5 G/DL (ref 1.5–4.5)
GLUCOSE SERPL-MCNC: 116 MG/DL (ref 65–99)
GLUCOSE UR QL: NEGATIVE
HCT VFR BLD AUTO: 41.1 % (ref 34–46.6)
HDLC SERPL-MCNC: 70 MG/DL
HGB BLD-MCNC: 14.1 G/DL (ref 11.1–15.9)
HGB UR QL STRIP: NEGATIVE
IMM GRANULOCYTES # BLD AUTO: 0 X10E3/UL (ref 0–0.1)
IMM GRANULOCYTES NFR BLD AUTO: 0 %
KETONES UR QL STRIP: NEGATIVE
LDLC SERPL CALC-MCNC: 129 MG/DL (ref 0–99)
LDLC/HDLC SERPL: 1.8 RATIO (ref 0–3.2)
LEUKOCYTE ESTERASE UR QL STRIP: ABNORMAL
LYMPHOCYTES # BLD AUTO: 2.5 X10E3/UL (ref 0.7–3.1)
LYMPHOCYTES NFR BLD AUTO: 26 %
MCH RBC QN AUTO: 31.7 PG (ref 26.6–33)
MCHC RBC AUTO-ENTMCNC: 34.3 G/DL (ref 31.5–35.7)
MCV RBC AUTO: 92 FL (ref 79–97)
MICRO URNS: ABNORMAL
MONOCYTES # BLD AUTO: 0.6 X10E3/UL (ref 0.1–0.9)
MONOCYTES NFR BLD AUTO: 6 %
NEUTROPHILS # BLD AUTO: 6.4 X10E3/UL (ref 1.4–7)
NEUTROPHILS NFR BLD AUTO: 66 %
NITRITE UR QL STRIP: NEGATIVE
PH UR STRIP: 5 [PH] (ref 5–7.5)
PLATELET # BLD AUTO: 249 X10E3/UL (ref 150–450)
POTASSIUM SERPL-SCNC: 4.4 MMOL/L (ref 3.5–5.2)
PROT SERPL-MCNC: 7.2 G/DL (ref 6–8.5)
PROT UR QL STRIP: NEGATIVE
RBC # BLD AUTO: 4.45 X10E6/UL (ref 3.77–5.28)
RBC #/AREA URNS HPF: ABNORMAL /HPF (ref 0–2)
SODIUM SERPL-SCNC: 143 MMOL/L (ref 134–144)
SP GR UR: 1.02 (ref 1–1.03)
TRIGL SERPL-MCNC: 138 MG/DL (ref 0–149)
TSH SERPL DL<=0.005 MIU/L-ACNC: 1.48 UIU/ML (ref 0.45–4.5)
UROBILINOGEN UR STRIP-MCNC: 0.2 MG/DL (ref 0.2–1)
VLDLC SERPL CALC-MCNC: 24 MG/DL (ref 5–40)
WBC # BLD AUTO: 9.6 X10E3/UL (ref 3.4–10.8)
WBC #/AREA URNS HPF: ABNORMAL /HPF (ref 0–5)

## 2021-05-19 ENCOUNTER — OFFICE VISIT (OUTPATIENT)
Dept: FAMILY MEDICINE CLINIC | Facility: CLINIC | Age: 71
End: 2021-05-19

## 2021-05-19 VITALS
HEART RATE: 66 BPM | TEMPERATURE: 96.8 F | SYSTOLIC BLOOD PRESSURE: 120 MMHG | BODY MASS INDEX: 27.99 KG/M2 | OXYGEN SATURATION: 98 % | RESPIRATION RATE: 16 BRPM | WEIGHT: 168 LBS | HEIGHT: 65 IN | DIASTOLIC BLOOD PRESSURE: 82 MMHG

## 2021-05-19 DIAGNOSIS — Z12.31 ENCOUNTER FOR SCREENING MAMMOGRAM FOR MALIGNANT NEOPLASM OF BREAST: ICD-10-CM

## 2021-05-19 DIAGNOSIS — Z78.0 POSTMENOPAUSAL: Primary | ICD-10-CM

## 2021-05-19 DIAGNOSIS — R73.01 IFG (IMPAIRED FASTING GLUCOSE): ICD-10-CM

## 2021-05-19 LAB — HBA1C MFR BLD: 6 %

## 2021-05-19 PROCEDURE — 99214 OFFICE O/P EST MOD 30 MIN: CPT | Performed by: INTERNAL MEDICINE

## 2021-05-19 PROCEDURE — 83036 HEMOGLOBIN GLYCOSYLATED A1C: CPT | Performed by: INTERNAL MEDICINE

## 2021-05-20 ENCOUNTER — TRANSCRIBE ORDERS (OUTPATIENT)
Dept: ADMINISTRATIVE | Facility: HOSPITAL | Age: 71
End: 2021-05-20

## 2021-05-20 DIAGNOSIS — Z12.31 BREAST CANCER SCREENING BY MAMMOGRAM: Primary | ICD-10-CM

## 2021-10-05 ENCOUNTER — HOSPITAL ENCOUNTER (OUTPATIENT)
Dept: MAMMOGRAPHY | Facility: HOSPITAL | Age: 71
Discharge: HOME | End: 2021-10-05

## 2021-10-05 ENCOUNTER — HOSPITAL ENCOUNTER (OUTPATIENT)
Dept: BONE DENSITY | Facility: HOSPITAL | Age: 71
Discharge: HOME | End: 2021-10-05

## 2021-10-05 DIAGNOSIS — Z12.31 BREAST CANCER SCREENING BY MAMMOGRAM: ICD-10-CM

## 2021-10-05 PROCEDURE — 77080 DXA BONE DENSITY AXIAL: CPT

## 2021-10-05 PROCEDURE — 77067 SCR MAMMO BI INCL CAD: CPT

## 2021-10-05 PROCEDURE — 77063 BREAST TOMOSYNTHESIS BI: CPT

## 2023-01-24 ENCOUNTER — OFFICE VISIT (OUTPATIENT)
Dept: FAMILY MEDICINE CLINIC | Facility: CLINIC | Age: 73
End: 2023-01-24
Payer: COMMERCIAL

## 2023-01-24 VITALS
RESPIRATION RATE: 16 BRPM | DIASTOLIC BLOOD PRESSURE: 78 MMHG | TEMPERATURE: 97.5 F | WEIGHT: 169 LBS | OXYGEN SATURATION: 100 % | SYSTOLIC BLOOD PRESSURE: 122 MMHG | HEART RATE: 69 BPM | BODY MASS INDEX: 28.16 KG/M2 | HEIGHT: 65 IN

## 2023-01-24 DIAGNOSIS — Z00.00 ROUTINE HEALTH MAINTENANCE: ICD-10-CM

## 2023-01-24 DIAGNOSIS — L71.9 ACNE ROSACEA: ICD-10-CM

## 2023-01-24 DIAGNOSIS — E55.9 VITAMIN D DEFICIENCY: ICD-10-CM

## 2023-01-24 DIAGNOSIS — K76.0 FATTY LIVER: Primary | ICD-10-CM

## 2023-01-24 DIAGNOSIS — Z13.6 SCREENING FOR HEART DISEASE: ICD-10-CM

## 2023-01-24 DIAGNOSIS — Z13.29 THYROID DISORDER SCREENING: ICD-10-CM

## 2023-01-24 DIAGNOSIS — R73.9 HYPERGLYCEMIA: ICD-10-CM

## 2023-01-24 DIAGNOSIS — F43.9 STRESS: Primary | ICD-10-CM

## 2023-01-24 DIAGNOSIS — Z00.00 ANNUAL PHYSICAL EXAM: ICD-10-CM

## 2023-01-24 DIAGNOSIS — K76.0 NAFLD (NONALCOHOLIC FATTY LIVER DISEASE): ICD-10-CM

## 2023-01-24 PROCEDURE — 93000 ELECTROCARDIOGRAM COMPLETE: CPT | Performed by: INTERNAL MEDICINE

## 2023-01-24 RX ORDER — DOXYCYCLINE HYCLATE 100 MG/1
100 CAPSULE ORAL EVERY OTHER DAY
Qty: 90 CAPSULE | Refills: 3 | Status: SHIPPED | OUTPATIENT
Start: 2023-01-24

## 2023-01-24 NOTE — PROGRESS NOTES
Subjective   Antonia Mendez is a 72 y.o. female. Patient is here today for   Chief Complaint   Patient presents with   • Annual Exam   • Med Management     Question about doxy          Vitals:    01/24/23 1100   BP: 122/78   Pulse: 69   Resp: 16   Temp: 97.5 °F (36.4 °C)   SpO2: 100%     Body mass index is 28.29 kg/m².      Past Medical History:   Diagnosis Date   • Essential tremor 9/26/2016   • NAFLD (nonalcoholic fatty liver disease) 11/16/2017    10/2017 liver U/S   • Tobacco abuse 9/26/2016      No Known Allergies   Social History     Socioeconomic History   • Marital status:    Tobacco Use   • Smoking status: Every Day     Types: Electronic Cigarette   • Smokeless tobacco: Never   Substance and Sexual Activity   • Alcohol use: Yes     Alcohol/week: 1.0 standard drink     Types: 1 Glasses of wine per week   • Drug use: No        Current Outpatient Medications:   •  doxycycline (VIBRAMYCIN) 100 MG capsule, Take 1 capsule by mouth Every Other Day., Disp: 90 capsule, Rfl: 3     Objective     History of Present Illness  This patient generally gets her prescription for doxycycline prescribed by her dermatologist.  She has me to fill that prescription for her today for the rosacea that she has.  She schedule appointment for an annual physical exam today Pat she has not had any preceding labs before today's visit.    She last saw her gynecologist 2 years ago.  Her last mammogram was approximately a year ago.  Her last bone density was approximately a year ago.    She has had some stresses in her life.  Her brother and sister-in-law are becoming increasingly frail and requiring her attention regularly.    She is worried about her 's health and stresses about that.    She does not smoke cigarettes.  She drinks alcohol moderately.    She has a history of nonalcoholic fatty liver.         Review of Systems   Constitutional: Negative.    HENT: Negative.    Eyes: Negative.    Respiratory: Negative.     Cardiovascular: Negative.    Gastrointestinal: Negative.    Endocrine: Negative.    Genitourinary: Negative.    Musculoskeletal: Negative.    Skin: Negative.    Allergic/Immunologic: Negative.    Neurological: Negative.    Hematological: Negative.    Psychiatric/Behavioral: Negative.        Physical Exam  Vitals and nursing note reviewed.   Constitutional:       General: She is not in acute distress.     Appearance: Normal appearance. She is not ill-appearing, toxic-appearing or diaphoretic.      Comments: Pleasant, neatly groomed, no distress.  72 years old.  BMI 28.   HENT:      Head: Normocephalic and atraumatic.      Right Ear: Tympanic membrane, ear canal and external ear normal. There is no impacted cerumen.      Left Ear: Tympanic membrane, ear canal and external ear normal. There is no impacted cerumen.      Nose: Nose normal.      Mouth/Throat:      Mouth: Mucous membranes are moist.      Pharynx: No oropharyngeal exudate or posterior oropharyngeal erythema.   Eyes:      General: No scleral icterus.        Right eye: No discharge.         Left eye: No discharge.      Extraocular Movements: Extraocular movements intact.      Conjunctiva/sclera: Conjunctivae normal.   Neck:      Vascular: No carotid bruit.   Cardiovascular:      Rate and Rhythm: Normal rate and regular rhythm.      Pulses: Normal pulses.      Heart sounds: Normal heart sounds. No murmur heard.    No friction rub. No gallop.   Pulmonary:      Effort: Pulmonary effort is normal. No respiratory distress.      Breath sounds: Normal breath sounds. No stridor. No wheezing, rhonchi or rales.   Chest:      Chest wall: No tenderness.   Abdominal:      General: Abdomen is flat.      Palpations: Abdomen is soft.   Genitourinary:     Comments: Deferred to gynecology.  Musculoskeletal:         General: No swelling, tenderness, deformity or signs of injury.      Cervical back: Normal range of motion and neck supple.      Right lower leg: No edema.       Left lower leg: No edema.   Skin:     General: Skin is warm and dry.      Capillary Refill: Capillary refill takes less than 2 seconds.      Coloration: Skin is not jaundiced or pale.      Findings: No bruising, erythema, lesion or rash.   Neurological:      General: No focal deficit present.      Mental Status: She is alert and oriented to person, place, and time.      Cranial Nerves: No cranial nerve deficit.      Sensory: No sensory deficit.      Motor: No weakness.      Coordination: Coordination normal.      Gait: Gait normal.      Deep Tendon Reflexes: Reflexes normal.   Psychiatric:         Mood and Affect: Mood normal.         Behavior: Behavior normal.           Problems Addressed this Visit        Endocrine and Metabolic    Vitamin D deficiency    Relevant Orders    Vitamin D,25-Hydroxy       Gastrointestinal Abdominal     NAFLD (nonalcoholic fatty liver disease)       Health Encounters    Annual physical exam   Other Visit Diagnoses     Fatty liver    -  Primary    Relevant Orders    US Liver    CBC & Differential    Comprehensive Metabolic Panel    Lipid Panel With LDL / HDL Ratio    Urinalysis With Microscopic If Indicated (No Culture) - Urine, Clean Catch    Acne rosacea        Relevant Medications    doxycycline (VIBRAMYCIN) 100 MG capsule    Thyroid disorder screening        Relevant Orders    TSH Rfx On Abnormal To Free T4    Hyperglycemia        Relevant Orders    Hemoglobin A1c      Diagnoses       Codes Comments    Fatty liver    -  Primary ICD-10-CM: K76.0  ICD-9-CM: 571.8     Acne rosacea     ICD-10-CM: L71.9  ICD-9-CM: 695.3     NAFLD (nonalcoholic fatty liver disease)     ICD-10-CM: K76.0  ICD-9-CM: 571.8     Annual physical exam     ICD-10-CM: Z00.00  ICD-9-CM: V70.0     Vitamin D deficiency     ICD-10-CM: E55.9  ICD-9-CM: 268.9     Thyroid disorder screening     ICD-10-CM: Z13.29  ICD-9-CM: V77.0     Hyperglycemia     ICD-10-CM: R73.9  ICD-9-CM: 790.29             PLAN  I put an order in  for some labs but I would like to get in light of her annual physical exam today.    Also put an order in for a right upper quadrant ultrasound to follow-up on her fatty liver.    I would like to have her back to discuss the results of her ultrasound and fasting lab work drawn over the next couple days.      No follow-ups on file.

## 2023-02-14 ENCOUNTER — HOSPITAL ENCOUNTER (OUTPATIENT)
Dept: ULTRASOUND IMAGING | Facility: HOSPITAL | Age: 73
Discharge: HOME | End: 2023-02-14
Payer: COMMERCIAL

## 2023-02-14 ENCOUNTER — LAB (OUTPATIENT)
Dept: LAB | Facility: HOSPITAL | Age: 73
End: 2023-02-14
Payer: COMMERCIAL

## 2023-02-14 DIAGNOSIS — K76.0 FATTY LIVER: ICD-10-CM

## 2023-02-14 LAB
25(OH)D3 SERPL-MCNC: 31.1 NG/ML (ref 30–100)
ALBUMIN SERPL-MCNC: 4 G/DL (ref 3.5–5.2)
ALBUMIN/GLOB SERPL: 1.4 G/DL
ALP SERPL-CCNC: 66 U/L (ref 39–117)
ALT SERPL W P-5'-P-CCNC: 28 U/L (ref 1–33)
ANION GAP SERPL CALCULATED.3IONS-SCNC: 9.6 MMOL/L (ref 5–15)
AST SERPL-CCNC: 25 U/L (ref 1–32)
BACTERIA UR QL AUTO: ABNORMAL /HPF
BASOPHILS # BLD AUTO: 0.06 10*3/MM3 (ref 0–0.2)
BASOPHILS NFR BLD AUTO: 0.8 % (ref 0–1.5)
BILIRUB SERPL-MCNC: 0.3 MG/DL (ref 0–1.2)
BILIRUB UR QL STRIP: NEGATIVE
BUN SERPL-MCNC: 14 MG/DL (ref 8–23)
BUN/CREAT SERPL: 18.4 (ref 7–25)
CALCIUM SPEC-SCNC: 9 MG/DL (ref 8.6–10.5)
CHLORIDE SERPL-SCNC: 107 MMOL/L (ref 98–107)
CHOLEST SERPL-MCNC: 196 MG/DL (ref 0–200)
CLARITY UR: CLEAR
CO2 SERPL-SCNC: 25.4 MMOL/L (ref 22–29)
COLOR UR: YELLOW
CREAT SERPL-MCNC: 0.76 MG/DL (ref 0.57–1)
DEPRECATED RDW RBC AUTO: 40.5 FL (ref 37–54)
EGFRCR SERPLBLD CKD-EPI 2021: 83.4 ML/MIN/1.73
EOSINOPHIL # BLD AUTO: 0.14 10*3/MM3 (ref 0–0.4)
EOSINOPHIL NFR BLD AUTO: 1.8 % (ref 0.3–6.2)
ERYTHROCYTE [DISTWIDTH] IN BLOOD BY AUTOMATED COUNT: 11.8 % (ref 12.3–15.4)
GLOBULIN UR ELPH-MCNC: 2.8 GM/DL
GLUCOSE SERPL-MCNC: 117 MG/DL (ref 65–99)
GLUCOSE UR STRIP-MCNC: NEGATIVE MG/DL
HBA1C MFR BLD: 5.9 % (ref 4.8–5.6)
HCT VFR BLD AUTO: 40.1 % (ref 34–46.6)
HDLC SERPL-MCNC: 69 MG/DL (ref 40–60)
HGB BLD-MCNC: 13.2 G/DL (ref 12–15.9)
HGB UR QL STRIP.AUTO: ABNORMAL
HYALINE CASTS UR QL AUTO: ABNORMAL /LPF
IMM GRANULOCYTES # BLD AUTO: 0.02 10*3/MM3 (ref 0–0.05)
IMM GRANULOCYTES NFR BLD AUTO: 0.3 % (ref 0–0.5)
KETONES UR QL STRIP: NEGATIVE
LDLC SERPL CALC-MCNC: 106 MG/DL (ref 0–100)
LDLC/HDLC SERPL: 1.49 {RATIO}
LEUKOCYTE ESTERASE UR QL STRIP.AUTO: ABNORMAL
LYMPHOCYTES # BLD AUTO: 2.09 10*3/MM3 (ref 0.7–3.1)
LYMPHOCYTES NFR BLD AUTO: 27.4 % (ref 19.6–45.3)
MCH RBC QN AUTO: 30.5 PG (ref 26.6–33)
MCHC RBC AUTO-ENTMCNC: 32.9 G/DL (ref 31.5–35.7)
MCV RBC AUTO: 92.6 FL (ref 79–97)
MONOCYTES # BLD AUTO: 0.47 10*3/MM3 (ref 0.1–0.9)
MONOCYTES NFR BLD AUTO: 6.2 % (ref 5–12)
NEUTROPHILS NFR BLD AUTO: 4.84 10*3/MM3 (ref 1.7–7)
NEUTROPHILS NFR BLD AUTO: 63.5 % (ref 42.7–76)
NITRITE UR QL STRIP: NEGATIVE
NRBC BLD AUTO-RTO: 0 /100 WBC (ref 0–0.2)
PH UR STRIP.AUTO: 5.5 [PH] (ref 5–8)
PLATELET # BLD AUTO: 223 10*3/MM3 (ref 140–450)
PMV BLD AUTO: 11.6 FL (ref 6–12)
POTASSIUM SERPL-SCNC: 4.3 MMOL/L (ref 3.5–5.2)
PROT SERPL-MCNC: 6.8 G/DL (ref 6–8.5)
PROT UR QL STRIP: NEGATIVE
RBC # BLD AUTO: 4.33 10*6/MM3 (ref 3.77–5.28)
RBC # UR STRIP: ABNORMAL /HPF
REF LAB TEST METHOD: ABNORMAL
SODIUM SERPL-SCNC: 142 MMOL/L (ref 136–145)
SP GR UR STRIP: >=1.03 (ref 1–1.03)
SQUAMOUS #/AREA URNS HPF: ABNORMAL /HPF
TRIGL SERPL-MCNC: 120 MG/DL (ref 0–150)
TSH SERPL DL<=0.05 MIU/L-ACNC: 1.73 UIU/ML (ref 0.27–4.2)
UROBILINOGEN UR QL STRIP: ABNORMAL
VLDLC SERPL-MCNC: 21 MG/DL (ref 5–40)
WBC # UR STRIP: ABNORMAL /HPF
WBC NRBC COR # BLD: 7.62 10*3/MM3 (ref 3.4–10.8)

## 2023-02-14 PROCEDURE — 83036 HEMOGLOBIN GLYCOSYLATED A1C: CPT | Performed by: INTERNAL MEDICINE

## 2023-02-14 PROCEDURE — 80050 GENERAL HEALTH PANEL: CPT | Performed by: INTERNAL MEDICINE

## 2023-02-14 PROCEDURE — 82306 VITAMIN D 25 HYDROXY: CPT | Performed by: INTERNAL MEDICINE

## 2023-02-14 PROCEDURE — 36415 COLL VENOUS BLD VENIPUNCTURE: CPT | Performed by: INTERNAL MEDICINE

## 2023-02-14 PROCEDURE — 76705 ECHO EXAM OF ABDOMEN: CPT

## 2023-02-14 PROCEDURE — 80061 LIPID PANEL: CPT | Performed by: INTERNAL MEDICINE

## 2023-02-14 PROCEDURE — 81001 URINALYSIS AUTO W/SCOPE: CPT | Performed by: INTERNAL MEDICINE

## 2023-02-16 ENCOUNTER — OFFICE VISIT (OUTPATIENT)
Dept: FAMILY MEDICINE CLINIC | Facility: CLINIC | Age: 73
End: 2023-02-16
Payer: COMMERCIAL

## 2023-02-16 VITALS
HEART RATE: 75 BPM | OXYGEN SATURATION: 97 % | BODY MASS INDEX: 26.52 KG/M2 | SYSTOLIC BLOOD PRESSURE: 124 MMHG | WEIGHT: 165 LBS | HEIGHT: 66 IN | TEMPERATURE: 96.6 F | DIASTOLIC BLOOD PRESSURE: 76 MMHG

## 2023-02-16 DIAGNOSIS — R79.89 LOW VITAMIN D LEVEL: ICD-10-CM

## 2023-02-16 DIAGNOSIS — K76.0 NAFLD (NONALCOHOLIC FATTY LIVER DISEASE): Primary | ICD-10-CM

## 2023-02-16 PROCEDURE — 99213 OFFICE O/P EST LOW 20 MIN: CPT | Performed by: INTERNAL MEDICINE

## 2023-02-16 RX ORDER — NITROFURANTOIN 25; 75 MG/1; MG/1
100 CAPSULE ORAL 2 TIMES DAILY
Qty: 14 CAPSULE | Refills: 0 | Status: SHIPPED | OUTPATIENT
Start: 2023-02-16

## 2023-02-16 NOTE — PROGRESS NOTES
Subjective   Antonia Mendez is a 72 y.o. female. Patient is here today for   Chief Complaint   Patient presents with   • Results          Vitals:    02/16/23 1124   BP: 124/76   Pulse: 75   Temp: 96.6 °F (35.9 °C)   SpO2: 97%     Body mass index is 26.63 kg/m².      Past Medical History:   Diagnosis Date   • Essential tremor 9/26/2016   • NAFLD (nonalcoholic fatty liver disease) 11/16/2017    10/2017 liver U/S   • Tobacco abuse 9/26/2016      No Known Allergies   Social History     Socioeconomic History   • Marital status:    Tobacco Use   • Smoking status: Every Day     Types: Electronic Cigarette   • Smokeless tobacco: Never   • Tobacco comments:     Ecigarette   Substance and Sexual Activity   • Alcohol use: Yes     Alcohol/week: 4.0 standard drinks     Types: 4 Glasses of wine per week   • Drug use: No   • Sexual activity: Not Currently     Partners: Male     Birth control/protection: Post-menopausal        Current Outpatient Medications:   •  doxycycline (VIBRAMYCIN) 100 MG capsule, Take 1 capsule by mouth Every Other Day., Disp: 90 capsule, Rfl: 3  •  nitrofurantoin, macrocrystal-monohydrate, (Macrobid) 100 MG capsule, Take 1 capsule by mouth 2 (Two) Times a Day., Disp: 14 capsule, Rfl: 0     Objective     History of Present Illness  She is here to follow-up on labs from last week.    I had arranged in January for her to have a right upper quadrant ultrasound to reassess her fatty liver.    She drinks 1 or 2 alcoholic beverages a week.               Review of Systems   Constitutional: Negative.    HENT: Negative.    Respiratory: Negative.    Cardiovascular: Negative.    Musculoskeletal: Negative.    Psychiatric/Behavioral: Negative.        Physical Exam  Vitals and nursing note reviewed.   Constitutional:       General: She is not in acute distress.     Appearance: Normal appearance. She is not ill-appearing, toxic-appearing or diaphoretic.      Comments: Pleasant, neatly groomed, no distress.  BMI 28.    Cardiovascular:      Rate and Rhythm: Regular rhythm.      Heart sounds: Normal heart sounds. No murmur heard.    No gallop.   Pulmonary:      Effort: No respiratory distress.      Breath sounds: Normal breath sounds. No wheezing or rales.   Neurological:      Mental Status: She is alert.   Psychiatric:         Mood and Affect: Mood normal.         Behavior: Behavior normal.         Thought Content: Thought content normal.           Problems Addressed this Visit        Gastrointestinal Abdominal     NAFLD (nonalcoholic fatty liver disease) - Primary       Symptoms and Signs    Low vitamin D level   Diagnoses       Codes Comments    NAFLD (nonalcoholic fatty liver disease)    -  Primary ICD-10-CM: K76.0  ICD-9-CM: 571.8     Low vitamin D level     ICD-10-CM: R79.89  ICD-9-CM: 790.6             PLAN  She has had a fatty liver for a long time.  An ultrasound last month confirmed with fatty liver.  No cirrhosis was appreciated.    I asked her to cut back on her alcohol consumption although she notes only 2 drinks weekly it would be good to cut that out.    She is overweight but not by much.  Weight loss, regular aerobic activity and plenty of fluids (water) hopefully will make a difference for her.    She is going to try to keep an appropriate diet.    Her vitamin D level is low.  I asked her to start taking vitamin D 5000 international units once daily.    Her urinalysis suggest urinary tract infection.  I sent out a prescription for nitrofurantoin.    Follow-up 6 months for annual physical exam.  Fasting labs prior to that visit should include: Lipid profile, comprehensive metabolic panel, CBC, urinalysis.  No follow-ups on file.

## 2023-08-21 DIAGNOSIS — R73.9 HYPERGLYCEMIA: Primary | ICD-10-CM

## 2023-08-24 LAB
ALBUMIN SERPL-MCNC: 4.9 G/DL (ref 3.5–5.2)
ALBUMIN/GLOB SERPL: 2.2 G/DL
ALP SERPL-CCNC: 76 U/L (ref 39–117)
ALT SERPL-CCNC: 36 U/L (ref 1–33)
APPEARANCE UR: CLEAR
AST SERPL-CCNC: 25 U/L (ref 1–32)
BACTERIA #/AREA URNS HPF: ABNORMAL /HPF
BASOPHILS # BLD AUTO: 0.06 10*3/MM3 (ref 0–0.2)
BASOPHILS NFR BLD AUTO: 0.7 % (ref 0–1.5)
BILIRUB SERPL-MCNC: 0.4 MG/DL (ref 0–1.2)
BILIRUB UR QL STRIP: NEGATIVE
BUN SERPL-MCNC: 15 MG/DL (ref 8–23)
BUN/CREAT SERPL: 16.9 (ref 7–25)
CALCIUM SERPL-MCNC: 10 MG/DL (ref 8.6–10.5)
CASTS URNS MICRO: ABNORMAL
CHLORIDE SERPL-SCNC: 104 MMOL/L (ref 98–107)
CHOLEST SERPL-MCNC: 241 MG/DL (ref 0–200)
CHOLEST/HDLC SERPL: 2.9 {RATIO}
CO2 SERPL-SCNC: 25.7 MMOL/L (ref 22–29)
COLOR UR: YELLOW
CREAT SERPL-MCNC: 0.89 MG/DL (ref 0.57–1)
EGFRCR SERPLBLD CKD-EPI 2021: 68.6 ML/MIN/1.73
EOSINOPHIL # BLD AUTO: 0.13 10*3/MM3 (ref 0–0.4)
EOSINOPHIL NFR BLD AUTO: 1.4 % (ref 0.3–6.2)
EPI CELLS #/AREA URNS HPF: ABNORMAL /HPF
ERYTHROCYTE [DISTWIDTH] IN BLOOD BY AUTOMATED COUNT: 12.2 % (ref 12.3–15.4)
GLOBULIN SER CALC-MCNC: 2.2 GM/DL
GLUCOSE SERPL-MCNC: 120 MG/DL (ref 65–99)
GLUCOSE UR QL STRIP: NEGATIVE
HCT VFR BLD AUTO: 42.4 % (ref 34–46.6)
HDLC SERPL-MCNC: 83 MG/DL (ref 40–60)
HGB BLD-MCNC: 14.3 G/DL (ref 12–15.9)
HGB UR QL STRIP: NEGATIVE
IMM GRANULOCYTES # BLD AUTO: 0.02 10*3/MM3 (ref 0–0.05)
IMM GRANULOCYTES NFR BLD AUTO: 0.2 % (ref 0–0.5)
KETONES UR QL STRIP: NEGATIVE
LDLC SERPL CALC-MCNC: 139 MG/DL (ref 0–100)
LEUKOCYTE ESTERASE UR QL STRIP: ABNORMAL
LYMPHOCYTES # BLD AUTO: 2.62 10*3/MM3 (ref 0.7–3.1)
LYMPHOCYTES NFR BLD AUTO: 28.8 % (ref 19.6–45.3)
MCH RBC QN AUTO: 32.1 PG (ref 26.6–33)
MCHC RBC AUTO-ENTMCNC: 33.7 G/DL (ref 31.5–35.7)
MCV RBC AUTO: 95.1 FL (ref 79–97)
MONOCYTES # BLD AUTO: 0.68 10*3/MM3 (ref 0.1–0.9)
MONOCYTES NFR BLD AUTO: 7.5 % (ref 5–12)
NEUTROPHILS # BLD AUTO: 5.6 10*3/MM3 (ref 1.7–7)
NEUTROPHILS NFR BLD AUTO: 61.4 % (ref 42.7–76)
NITRITE UR QL STRIP: NEGATIVE
NRBC BLD AUTO-RTO: 0 /100 WBC (ref 0–0.2)
PH UR STRIP: 5.5 [PH] (ref 5–8)
PLATELET # BLD AUTO: 221 10*3/MM3 (ref 140–450)
POTASSIUM SERPL-SCNC: 4.8 MMOL/L (ref 3.5–5.2)
PROT SERPL-MCNC: 7.1 G/DL (ref 6–8.5)
PROT UR QL STRIP: NEGATIVE
RBC # BLD AUTO: 4.46 10*6/MM3 (ref 3.77–5.28)
RBC #/AREA URNS HPF: ABNORMAL /HPF
SODIUM SERPL-SCNC: 141 MMOL/L (ref 136–145)
SP GR UR STRIP: 1.02 (ref 1–1.03)
TRIGL SERPL-MCNC: 113 MG/DL (ref 0–150)
UROBILINOGEN UR STRIP-MCNC: ABNORMAL MG/DL
VLDLC SERPL CALC-MCNC: 19 MG/DL (ref 5–40)
WBC # BLD AUTO: 9.11 10*3/MM3 (ref 3.4–10.8)
WBC #/AREA URNS HPF: ABNORMAL /HPF

## 2023-08-30 ENCOUNTER — OFFICE VISIT (OUTPATIENT)
Dept: FAMILY MEDICINE CLINIC | Facility: CLINIC | Age: 73
End: 2023-08-30
Payer: MEDICARE

## 2023-08-30 VITALS
HEART RATE: 80 BPM | BODY MASS INDEX: 27.34 KG/M2 | WEIGHT: 170.1 LBS | DIASTOLIC BLOOD PRESSURE: 66 MMHG | SYSTOLIC BLOOD PRESSURE: 122 MMHG | TEMPERATURE: 98.4 F | OXYGEN SATURATION: 98 % | RESPIRATION RATE: 18 BRPM | HEIGHT: 66 IN

## 2023-08-30 DIAGNOSIS — R73.9 HYPERGLYCEMIA: Primary | ICD-10-CM

## 2023-08-30 DIAGNOSIS — E78.00 HYPERCHOLESTEROLEMIA: ICD-10-CM

## 2023-08-30 DIAGNOSIS — Z00.00 MEDICARE ANNUAL WELLNESS VISIT, SUBSEQUENT: ICD-10-CM

## 2023-08-30 RX ORDER — ERGOCALCIFEROL (VITAMIN D2) 10 MCG
400 TABLET ORAL DAILY
COMMUNITY

## 2023-08-30 RX ORDER — VITAMIN B COMPLEX
CAPSULE ORAL DAILY
COMMUNITY

## 2023-08-30 NOTE — PROGRESS NOTES
"The ABCs of the Annual Wellness Visit  Subsequent Medicare Wellness Visit    Subjective    Antonia Mendez is a 73 y.o. female who presents for a Subsequent Medicare Wellness Visit.    The following portions of the patient's history were reviewed and   updated as appropriate: {history reviewed:20406::\"allergies\",\"current medications\",\"past family history\",\"past medical history\",\"past social history\",\"past surgical history\",\"problem list\"}.    Compared to one year ago, the patient feels her physical   health is {better worse same:79398}.    Compared to one year ago, the patient feels her mental   health is {better worse same:65605}.    Recent Hospitalizations:  {Hospital Admission Status in the last 365 days:94801}      Current Medical Providers:  Patient Care Team:  Fracisco Barnard MD as PCP - General (Internal Medicine)  Iris Weiner MD (Inactive) as Consulting Physician (Gynecology)  Tennille Mendoza MD as Surgeon (General Surgery)  Fracisco Barnard MD as Consulting Physician (Internal Medicine)    Outpatient Medications Prior to Visit   Medication Sig Dispense Refill    B Complex Vitamins (vitamin b complex) capsule capsule Take  by mouth Daily.      doxycycline (VIBRAMYCIN) 100 MG capsule Take 1 capsule by mouth Every Other Day. 90 capsule 3    Vitamin D, Cholecalciferol, (CHOLECALCIFEROL) 10 MCG (400 UNIT) tablet Take 1 tablet by mouth Daily.      nitrofurantoin, macrocrystal-monohydrate, (Macrobid) 100 MG capsule Take 1 capsule by mouth 2 (Two) Times a Day. (Patient not taking: Reported on 8/30/2023) 14 capsule 0     No facility-administered medications prior to visit.       No opioid medication identified on active medication list. I have reviewed chart for other potential  high risk medication/s and harmful drug interactions in the elderly.        Aspirin is not on active medication list.  {ASPIRIN NOT ON MEDICATION LIST INDICATED/NOT INDICATED:85574}.    Patient Active Problem List   Diagnosis    " "Essential tremor    NAFLD (nonalcoholic fatty liver disease)    Adenomatous polyp of colon    Annual physical exam    Vitamin D deficiency    Low vitamin D level     Advance Care Planning   Advance Care Planning     Advance Directive is not on file.  {ACP Discussion, Advance Directive not in EMR:51017}     Objective    Vitals:    23 1555   BP: 122/66   BP Location: Left arm   Patient Position: Sitting   Cuff Size: Adult   Pulse: 80   Resp: 18   Temp: 98.4 °F (36.9 °C)   TempSrc: Oral   SpO2: 98%   Weight: 77.2 kg (170 lb 1.6 oz)   Height: 167.6 cm (65.98\")     Estimated body mass index is 27.47 kg/m² as calculated from the following:    Height as of this encounter: 167.6 cm (65.98\").    Weight as of this encounter: 77.2 kg (170 lb 1.6 oz).    {BMI is >= 25 and <30. (Overweight) The following options were offered after discussion;:5249114898}      Does the patient have evidence of cognitive impairment? {Yes/No:09465}    Lab Results   Component Value Date    CHLPL 241 (H) 2023    TRIG 113 2023    HDL 83 (H) 2023     (H) 2023    VLDL 19 2023        HEALTH RISK ASSESSMENT    Smoking Status:  Social History     Tobacco Use   Smoking Status Every Day    Types: Electronic Cigarette   Smokeless Tobacco Never   Tobacco Comments    Ecigarette     Alcohol Consumption:  Social History     Substance and Sexual Activity   Alcohol Use Yes    Alcohol/week: 4.0 standard drinks    Types: 4 Glasses of wine per week     Fall Risk Screen:    STEADI Fall Risk Assessment was completed, and patient is at LOW risk for falls.Assessment completed on:2023    Depression Screenin/30/2023     3:49 PM   PHQ-2/PHQ-9 Depression Screening   Little Interest or Pleasure in Doing Things 1-->several days   Feeling Down, Depressed or Hopeless 1-->several days   Trouble Falling or Staying Asleep, or Sleeping Too Much 3-->nearly every day   Feeling Tired or Having Little Energy 0-->not at all   Poor " Appetite or Overeating 1-->several days   Feeling Bad about Yourself - or that You are a Failure or Have Let Yourself or Your Family Down 0-->not at all   Trouble Concentrating on Things, Such as Reading the Newspaper or Watching Television 0-->not at all   Moving or Speaking So Slowly that Other People Could Have Noticed? Or the Opposite - Being So Fidgety 0-->not at all   Thoughts that You Would be Better Off Dead or of Hurting Yourself in Some Way 0-->not at all   PHQ-9: Brief Depression Severity Measure Score 6   If You Checked Off Any Problems, How Difficult Have These Problems Made It For You to Do Your Work, Take Care of Things at Home, or Get Along with Other People? somewhat difficult       Health Habits and Functional and Cognitive Screenin/30/2023     3:00 PM   Functional & Cognitive Status   Do you have difficulty preparing food and eating? No   Do you have difficulty bathing yourself, getting dressed or grooming yourself? No   Do you have difficulty using the toilet? No   Do you have difficulty moving around from place to place? No   Do you have trouble with steps or getting out of a bed or a chair? No   Current Diet Unhealthy Diet   Dental Exam Up to date   Eye Exam Up to date   Exercise (times per week) 2 times per week   Current Exercises Include Walking   Do you need help using the phone?  No   Are you deaf or do you have serious difficulty hearing?  No   Do you need help to go to places out of walking distance? No   Do you need help shopping? No   Do you need help preparing meals?  No   Do you need help with housework?  No   Do you need help with laundry? No   Do you need help taking your medications? No   Do you need help managing money? No   Have you felt unusual stress, anger or loneliness in the last month? Yes   Who do you live with? Alone   If you need help, do you have trouble finding someone available to you? No   Have you been bothered in the last four weeks by sexual problems? No    Do you have difficulty concentrating, remembering or making decisions? No       Age-appropriate Screening Schedule:  Refer to the list below for future screening recommendations based on patient's age, sex and/or medical conditions. Orders for these recommended tests are listed in the plan section. The patient has been provided with a written plan.    Health Maintenance   Topic Date Due    BMI FOLLOWUP  Never done    ZOSTER VACCINE (3 of 3) 03/03/2021    COVID-19 Vaccine (6 - Pfizer series) 01/09/2023    INFLUENZA VACCINE  10/01/2023    MAMMOGRAM  10/05/2023    DXA SCAN  10/05/2023    TDAP/TD VACCINES (2 - Td or Tdap) 12/02/2023    ANNUAL PHYSICAL  01/24/2024    LIPID PANEL  08/23/2024    COLORECTAL CANCER SCREENING  04/22/2029    HEPATITIS C SCREENING  Completed    Pneumococcal Vaccine 65+  Completed                  CMS Preventative Services Quick Reference  Risk Factors Identified During Encounter  {Medicare Wellness Risk Factors:39690}  The above risks/problems have been discussed with the patient.  Pertinent information has been shared with the patient in the After Visit Summary.  An After Visit Summary and PPPS were made available to the patient.    Follow Up:   Next Medicare Wellness visit to be scheduled in 1 year.   {Wrapup  Review (Popup)  Advance Care Planning  Labs  CC  Problem List  Visit Diagnosis  Medications  Result Review  Imaging  Health Maintenance  Quality  BestPractice  SmartSets  SnapShot  Encounters  Notes  Media  Procedures :23}    Additional E&M Note during same encounter follows:  Patient has multiple medical problems which are significant and separately identifiable that require additional work above and beyond the Medicare Wellness Visit.      Chief Complaint  Medicare Wellness-subsequent    Subjective    {Problem List  Visit Diagnosis   Encounters  Notes  Medications  Labs  Result Review Imaging  Media :23}    GABY  Antonia Mendez is also being seen today for  "***         Objective   Vital Signs:  /66 (BP Location: Left arm, Patient Position: Sitting, Cuff Size: Adult)   Pulse 80   Temp 98.4 °F (36.9 °C) (Oral)   Resp 18   Ht 167.6 cm (65.98\")   Wt 77.2 kg (170 lb 1.6 oz)   SpO2 98%   BMI 27.47 kg/m²     Physical Exam     {The following data was reviewed by (Optional):99778}  {Ambulatory Labs (Optional):69189}  {Data reviewed (Optional):70626:::1}           Assessment and Plan {CC Problem List  Visit Diagnosis   ROS  Review (Popup)  Health Maintenance  Quality  BestPractice  Medications  SmartSets  SnapShot Encounters  Media :23}  There are no diagnoses linked to this encounter.       {Time Spent (Optional):86339}  Follow Up {Instructions Charge Capture  Follow-up Communications :23}  No follow-ups on file.  Patient was given instructions and counseling regarding her condition or for health maintenance advice. Please see specific information pulled into the AVS if appropriate.           "

## 2023-08-30 NOTE — PROGRESS NOTES
The ABCs of the Annual Wellness Visit  Subsequent Medicare Wellness Visit    Subjective    Antonia Mendez is a 73 y.o. female who presents for a Subsequent Medicare Wellness Visit.    The following portions of the patient's history were reviewed and   updated as appropriate: allergies, current medications, past family history, past medical history, past social history, past surgical history, and problem list.    Compared to one year ago, the patient feels her physical   health is the same.    Compared to one year ago, the patient feels her mental   health is worse.    Recent Hospitalizations:  She was not admitted to the hospital during the last year.       Current Medical Providers:  Patient Care Team:  Fracisco Barnard MD as PCP - General (Internal Medicine)  Iris Weiner MD (Inactive) as Consulting Physician (Gynecology)  Tennille Mendoza MD as Surgeon (General Surgery)  Fracisco Barnard MD as Consulting Physician (Internal Medicine)    Outpatient Medications Prior to Visit   Medication Sig Dispense Refill    B Complex Vitamins (vitamin b complex) capsule capsule Take  by mouth Daily.      doxycycline (VIBRAMYCIN) 100 MG capsule Take 1 capsule by mouth Every Other Day. 90 capsule 3    Vitamin D, Cholecalciferol, (CHOLECALCIFEROL) 10 MCG (400 UNIT) tablet Take 1 tablet by mouth Daily.      nitrofurantoin, macrocrystal-monohydrate, (Macrobid) 100 MG capsule Take 1 capsule by mouth 2 (Two) Times a Day. (Patient not taking: Reported on 8/30/2023) 14 capsule 0     No facility-administered medications prior to visit.       No opioid medication identified on active medication list. I have reviewed chart for other potential  high risk medication/s and harmful drug interactions in the elderly.        Aspirin is not on active medication list.  Aspirin use is not indicated based on review of current medical condition/s. Risk of harm outweighs potential benefits.  .    Patient Active Problem List   Diagnosis    Essential  "tremor    NAFLD (nonalcoholic fatty liver disease)    Adenomatous polyp of colon    Medicare annual wellness visit, subsequent    Vitamin D deficiency    Low vitamin D level    Hyperglycemia    Hypercholesterolemia     Advance Care Planning   Advance Care Planning     Advance Directive is not on file.  ACP discussion was held with the patient during this visit. Patient has an advance directive (not in EMR), copy requested.     Objective    Vitals:    23 1555   BP: 122/66   BP Location: Left arm   Patient Position: Sitting   Cuff Size: Adult   Pulse: 80   Resp: 18   Temp: 98.4 °F (36.9 °C)   TempSrc: Oral   SpO2: 98%   Weight: 77.2 kg (170 lb 1.6 oz)   Height: 167.6 cm (65.98\")     Estimated body mass index is 27.47 kg/m² as calculated from the following:    Height as of this encounter: 167.6 cm (65.98\").    Weight as of this encounter: 77.2 kg (170 lb 1.6 oz).    BMI is >= 25 and <30. (Overweight) The following options were offered after discussion;: exercise counseling/recommendations and nutrition counseling/recommendations      Does the patient have evidence of cognitive impairment? No    Lab Results   Component Value Date    CHLPL 241 (H) 2023    TRIG 113 2023    HDL 83 (H) 2023     (H) 2023    VLDL 19 2023        HEALTH RISK ASSESSMENT    Smoking Status:  Social History     Tobacco Use   Smoking Status Every Day    Types: Electronic Cigarette   Smokeless Tobacco Never   Tobacco Comments    Ecigarette     Alcohol Consumption:  Social History     Substance and Sexual Activity   Alcohol Use Yes    Alcohol/week: 4.0 standard drinks    Types: 4 Glasses of wine per week     Fall Risk Screen:    STEADI Fall Risk Assessment was completed, and patient is at LOW risk for falls.Assessment completed on:2023    Depression Screenin/30/2023     3:49 PM   PHQ-2/PHQ-9 Depression Screening   Little Interest or Pleasure in Doing Things 1-->several days   Feeling Down, " Depressed or Hopeless 1-->several days   Trouble Falling or Staying Asleep, or Sleeping Too Much 3-->nearly every day   Feeling Tired or Having Little Energy 0-->not at all   Poor Appetite or Overeating 1-->several days   Feeling Bad about Yourself - or that You are a Failure or Have Let Yourself or Your Family Down 0-->not at all   Trouble Concentrating on Things, Such as Reading the Newspaper or Watching Television 0-->not at all   Moving or Speaking So Slowly that Other People Could Have Noticed? Or the Opposite - Being So Fidgety 0-->not at all   Thoughts that You Would be Better Off Dead or of Hurting Yourself in Some Way 0-->not at all   PHQ-9: Brief Depression Severity Measure Score 6   If You Checked Off Any Problems, How Difficult Have These Problems Made It For You to Do Your Work, Take Care of Things at Home, or Get Along with Other People? somewhat difficult       Health Habits and Functional and Cognitive Screenin/30/2023     3:00 PM   Functional & Cognitive Status   Do you have difficulty preparing food and eating? No   Do you have difficulty bathing yourself, getting dressed or grooming yourself? No   Do you have difficulty using the toilet? No   Do you have difficulty moving around from place to place? No   Do you have trouble with steps or getting out of a bed or a chair? No   Current Diet Unhealthy Diet   Dental Exam Up to date   Eye Exam Up to date   Exercise (times per week) 2 times per week   Current Exercises Include Walking   Do you need help using the phone?  No   Are you deaf or do you have serious difficulty hearing?  No   Do you need help to go to places out of walking distance? No   Do you need help shopping? No   Do you need help preparing meals?  No   Do you need help with housework?  No   Do you need help with laundry? No   Do you need help taking your medications? No   Do you need help managing money? No   Have you felt unusual stress, anger or loneliness in the last month?  Yes   Who do you live with? Alone   If you need help, do you have trouble finding someone available to you? No   Have you been bothered in the last four weeks by sexual problems? No   Do you have difficulty concentrating, remembering or making decisions? No       Age-appropriate Screening Schedule:  Refer to the list below for future screening recommendations based on patient's age, sex and/or medical conditions. Orders for these recommended tests are listed in the plan section. The patient has been provided with a written plan.    Health Maintenance   Topic Date Due    BMI FOLLOWUP  Never done    ZOSTER VACCINE (3 of 3) 03/03/2021    COVID-19 Vaccine (6 - Pfizer series) 01/09/2023    INFLUENZA VACCINE  10/01/2023    MAMMOGRAM  10/05/2023    DXA SCAN  10/05/2023    TDAP/TD VACCINES (2 - Td or Tdap) 12/02/2023    ANNUAL PHYSICAL  01/24/2024    LIPID PANEL  08/23/2024    COLORECTAL CANCER SCREENING  04/22/2029    HEPATITIS C SCREENING  Completed    Pneumococcal Vaccine 65+  Completed                  CMS Preventative Services Quick Reference  Risk Factors Identified During Encounter  None Identified  The above risks/problems have been discussed with the patient.  Pertinent information has been shared with the patient in the After Visit Summary.  An After Visit Summary and PPPS were made available to the patient.    Follow Up:   Next Medicare Wellness visit to be scheduled in 1 year.       Additional E&M Note during same encounter follows:  Patient has multiple medical problems which are significant and separately identifiable that require additional work above and beyond the Medicare Wellness Visit.      Chief Complaint  Medicare Wellness-subsequent    Subjective        She is here today for Medicare annual wellness visit.        Antonia Mendez is also being seen today for HYPERCHOLESTEROLEMIA      Review of Systems   Constitutional: Negative.    HENT: Negative.     Respiratory: Negative.  Negative for apnea.   "  Cardiovascular: Negative.    Musculoskeletal: Negative.    Psychiatric/Behavioral: Negative.       Objective   Vital Signs:  /66 (BP Location: Left arm, Patient Position: Sitting, Cuff Size: Adult)   Pulse 80   Temp 98.4 °F (36.9 °C) (Oral)   Resp 18   Ht 167.6 cm (65.98\")   Wt 77.2 kg (170 lb 1.6 oz)   SpO2 98%   BMI 27.47 kg/m²     Physical Exam  Vitals and nursing note reviewed.   Constitutional:       General: She is not in acute distress.     Appearance: Normal appearance. She is normal weight. She is not ill-appearing, toxic-appearing or diaphoretic.      Comments: Pleasant, neatly groomed, no distress.  BMI 27.   Cardiovascular:      Rate and Rhythm: Regular rhythm.      Heart sounds: Normal heart sounds. No murmur heard.    No gallop.   Pulmonary:      Effort: No respiratory distress.      Breath sounds: Normal breath sounds. No wheezing or rales.   Neurological:      Mental Status: She is alert and oriented to person, place, and time.   Psychiatric:         Mood and Affect: Mood normal.         Behavior: Behavior normal.         Thought Content: Thought content normal.                       Assessment and Plan   Diagnoses and all orders for this visit:    1. Hyperglycemia (Primary)    2. Hypercholesterolemia    3. Medicare annual wellness visit, subsequent      She is prediabetic.  She is slightly overweight with BMI of 27.    She has hypercholesterolemia.    She is not enthusiastic about starting medication to treat her hypercholesterolemia.    She is going to do what she can do to bring down her cholesterol with diet and exercise.    I asked her to follow-up with me in December.  Fasting labs prior that visit should include: Hemoglobin A1c, comprehensive metabolic panel, CBC, urinalysis, lipid profile.    It would be good to screen her for a vitamin D deficiency a week prior to that visit as well.       Follow Up   No follow-ups on file.  Patient was given instructions and counseling " regarding her condition or for health maintenance advice. Please see specific information pulled into the AVS if appropriate.

## 2023-09-26 PROBLEM — E78.00 HYPERCHOLESTEROLEMIA: Status: ACTIVE | Noted: 2023-09-26

## 2023-09-26 PROBLEM — R73.9 HYPERGLYCEMIA: Status: ACTIVE | Noted: 2023-09-26

## 2023-12-04 DIAGNOSIS — E78.00 HYPERCHOLESTEROLEMIA: ICD-10-CM

## 2023-12-04 DIAGNOSIS — R73.9 HYPERGLYCEMIA: ICD-10-CM

## 2023-12-04 DIAGNOSIS — E55.9 VITAMIN D DEFICIENCY: Primary | ICD-10-CM

## 2023-12-07 LAB
25(OH)D3+25(OH)D2 SERPL-MCNC: 32.4 NG/ML (ref 30–100)
ALBUMIN SERPL-MCNC: 4.8 G/DL (ref 3.5–5.2)
ALBUMIN/GLOB SERPL: 2.1 G/DL
ALP SERPL-CCNC: 78 U/L (ref 39–117)
ALT SERPL-CCNC: 29 U/L (ref 1–33)
APPEARANCE UR: ABNORMAL
AST SERPL-CCNC: 25 U/L (ref 1–32)
BACTERIA #/AREA URNS HPF: ABNORMAL /HPF
BASOPHILS # BLD AUTO: 0.07 10*3/MM3 (ref 0–0.2)
BASOPHILS NFR BLD AUTO: 0.8 % (ref 0–1.5)
BILIRUB SERPL-MCNC: 0.5 MG/DL (ref 0–1.2)
BILIRUB UR QL STRIP: NEGATIVE
BUN SERPL-MCNC: 12 MG/DL (ref 8–23)
BUN/CREAT SERPL: 14.1 (ref 7–25)
CALCIUM SERPL-MCNC: 10.2 MG/DL (ref 8.6–10.5)
CASTS URNS MICRO: ABNORMAL
CHLORIDE SERPL-SCNC: 106 MMOL/L (ref 98–107)
CHOLEST SERPL-MCNC: 246 MG/DL (ref 0–200)
CO2 SERPL-SCNC: 26.2 MMOL/L (ref 22–29)
COLOR UR: YELLOW
CREAT SERPL-MCNC: 0.85 MG/DL (ref 0.57–1)
EGFRCR SERPLBLD CKD-EPI 2021: 72.4 ML/MIN/1.73
EOSINOPHIL # BLD AUTO: 0.13 10*3/MM3 (ref 0–0.4)
EOSINOPHIL NFR BLD AUTO: 1.5 % (ref 0.3–6.2)
EPI CELLS #/AREA URNS HPF: ABNORMAL /HPF
ERYTHROCYTE [DISTWIDTH] IN BLOOD BY AUTOMATED COUNT: 12 % (ref 12.3–15.4)
GLOBULIN SER CALC-MCNC: 2.3 GM/DL
GLUCOSE SERPL-MCNC: 96 MG/DL (ref 65–99)
GLUCOSE UR QL STRIP: NEGATIVE
HBA1C MFR BLD: 5.6 % (ref 4.8–5.6)
HCT VFR BLD AUTO: 41.7 % (ref 34–46.6)
HDLC SERPL-MCNC: 71 MG/DL (ref 40–60)
HGB BLD-MCNC: 14.4 G/DL (ref 12–15.9)
HGB UR QL STRIP: NEGATIVE
IMM GRANULOCYTES # BLD AUTO: 0.03 10*3/MM3 (ref 0–0.05)
IMM GRANULOCYTES NFR BLD AUTO: 0.3 % (ref 0–0.5)
KETONES UR QL STRIP: NEGATIVE
LDLC SERPL CALC-MCNC: 156 MG/DL (ref 0–100)
LDLC/HDLC SERPL: 2.15 {RATIO}
LEUKOCYTE ESTERASE UR QL STRIP: ABNORMAL
LYMPHOCYTES # BLD AUTO: 2.53 10*3/MM3 (ref 0.7–3.1)
LYMPHOCYTES NFR BLD AUTO: 28.8 % (ref 19.6–45.3)
MCH RBC QN AUTO: 31.9 PG (ref 26.6–33)
MCHC RBC AUTO-ENTMCNC: 34.5 G/DL (ref 31.5–35.7)
MCV RBC AUTO: 92.3 FL (ref 79–97)
MONOCYTES # BLD AUTO: 0.58 10*3/MM3 (ref 0.1–0.9)
MONOCYTES NFR BLD AUTO: 6.6 % (ref 5–12)
NEUTROPHILS # BLD AUTO: 5.43 10*3/MM3 (ref 1.7–7)
NEUTROPHILS NFR BLD AUTO: 62 % (ref 42.7–76)
NITRITE UR QL STRIP: NEGATIVE
NRBC BLD AUTO-RTO: 0 /100 WBC (ref 0–0.2)
PH UR STRIP: 5.5 [PH] (ref 5–8)
PLATELET # BLD AUTO: 213 10*3/MM3 (ref 140–450)
POTASSIUM SERPL-SCNC: 4.7 MMOL/L (ref 3.5–5.2)
PROT SERPL-MCNC: 7.1 G/DL (ref 6–8.5)
PROT UR QL STRIP: NEGATIVE
RBC # BLD AUTO: 4.52 10*6/MM3 (ref 3.77–5.28)
RBC #/AREA URNS HPF: ABNORMAL /HPF
SODIUM SERPL-SCNC: 142 MMOL/L (ref 136–145)
SP GR UR STRIP: 1.02 (ref 1–1.03)
TRIGL SERPL-MCNC: 110 MG/DL (ref 0–150)
UROBILINOGEN UR STRIP-MCNC: ABNORMAL MG/DL
VLDLC SERPL CALC-MCNC: 19 MG/DL (ref 5–40)
WBC # BLD AUTO: 8.77 10*3/MM3 (ref 3.4–10.8)
WBC #/AREA URNS HPF: ABNORMAL /HPF

## 2023-12-13 ENCOUNTER — OFFICE VISIT (OUTPATIENT)
Dept: FAMILY MEDICINE CLINIC | Facility: CLINIC | Age: 73
End: 2023-12-13
Payer: COMMERCIAL

## 2023-12-13 VITALS
TEMPERATURE: 97.9 F | WEIGHT: 154.1 LBS | RESPIRATION RATE: 18 BRPM | SYSTOLIC BLOOD PRESSURE: 122 MMHG | HEIGHT: 66 IN | DIASTOLIC BLOOD PRESSURE: 80 MMHG | OXYGEN SATURATION: 98 % | HEART RATE: 71 BPM | BODY MASS INDEX: 24.77 KG/M2

## 2023-12-13 DIAGNOSIS — R73.9 HYPERGLYCEMIA: ICD-10-CM

## 2023-12-13 DIAGNOSIS — E78.00 HYPERCHOLESTEROLEMIA: Primary | ICD-10-CM

## 2023-12-13 PROCEDURE — 99213 OFFICE O/P EST LOW 20 MIN: CPT | Performed by: INTERNAL MEDICINE

## 2023-12-26 NOTE — PROGRESS NOTES
This patient is here to follow-up on hypercholesterolemia and vitamin D deficiency.    She drinks 4 alcoholic beverages a week.  She currently vapes.  Subjective   Antonia Mendez is a 73 y.o. female. Patient is here today for   Chief Complaint   Patient presents with    Follow-up    Hyperlipidemia    Vitamin D Deficiency     Follow up on labs           Vitals:    12/13/23 1005   BP: 122/80   Pulse: 71   Resp: 18   Temp: 97.9 °F (36.6 °C)   SpO2: 98%     Body mass index is 24.88 kg/m².      Past Medical History:   Diagnosis Date    Essential tremor 9/26/2016    NAFLD (nonalcoholic fatty liver disease) 11/16/2017    10/2017 liver U/S    Tobacco abuse 9/26/2016      No Known Allergies   Social History     Socioeconomic History    Marital status:    Tobacco Use    Smoking status: Every Day     Types: Electronic Cigarette     Passive exposure: Current    Smokeless tobacco: Never    Tobacco comments:     Ecigarette   Substance and Sexual Activity    Alcohol use: Yes     Alcohol/week: 4.0 standard drinks of alcohol     Types: 4 Glasses of wine per week    Drug use: No    Sexual activity: Not Currently     Partners: Male     Birth control/protection: Post-menopausal        Current Outpatient Medications:     B Complex Vitamins (vitamin b complex) capsule capsule, Take  by mouth Daily., Disp: , Rfl:     doxycycline (VIBRAMYCIN) 100 MG capsule, Take 1 capsule by mouth Every Other Day., Disp: 90 capsule, Rfl: 3    Vitamin D, Cholecalciferol, (CHOLECALCIFEROL) 10 MCG (400 UNIT) tablet, Take 1 tablet by mouth Daily., Disp: , Rfl:      Objective     History of Present Illness  She is here to follow-up on hypercholesterolemia and hypertension.    She drinks 4 alcoholic beverages a week.    She no longer smokes cigarettes but she does vape.  Hyperlipidemia         Review of Systems   Constitutional: Negative.    HENT: Negative.     Respiratory: Negative.     Cardiovascular: Negative.    Musculoskeletal: Negative.     Psychiatric/Behavioral: Negative.         Physical Exam  Vitals and nursing note reviewed.   Constitutional:       Appearance: Normal appearance.      Comments: Pleasant, neatly groomed, no distress.   Cardiovascular:      Rate and Rhythm: Regular rhythm.      Heart sounds: Normal heart sounds. No murmur heard.     No gallop.   Pulmonary:      Effort: No respiratory distress.      Breath sounds: Normal breath sounds. No wheezing or rales.   Neurological:      Mental Status: She is alert and oriented to person, place, and time.   Psychiatric:         Mood and Affect: Mood normal.         Behavior: Behavior normal.         Thought Content: Thought content normal.           Problems Addressed this Visit          Cardiac and Vasculature    Hypercholesterolemia - Primary       Endocrine and Metabolic    Hyperglycemia     Diagnoses         Codes Comments    Hypercholesterolemia    -  Primary ICD-10-CM: E78.00  ICD-9-CM: 272.0     Hyperglycemia     ICD-10-CM: R73.9  ICD-9-CM: 790.29               PLAN  Her cholesterol is too high.  I offered to write her prescription for cholesterol-lowering medication at her age she really needs a much lower LDL cholesterol but she is reluctant to start medication.    I did ask her to get vascular screening gave her handout on that.    She has hyperglycemia.  She feels that she can address this issue with more prudent diet.  She intends to incorporate dietary changes consistent with a lower cholesterol and lower glucose.    Would like her to follow-up with me in about 4 months.  About a week before that visit I would like to have the following labs done: Hemoglobin A1c, lipid profile, comprehensive metabolic panel and urinalysis.  No follow-ups on file.

## 2024-04-17 ENCOUNTER — LAB (OUTPATIENT)
Dept: FAMILY MEDICINE CLINIC | Facility: CLINIC | Age: 74
End: 2024-04-17
Payer: COMMERCIAL

## 2024-04-17 DIAGNOSIS — R73.9 HYPERGLYCEMIA: ICD-10-CM

## 2024-04-17 DIAGNOSIS — E78.00 HYPERCHOLESTEROLEMIA: Primary | ICD-10-CM

## 2024-04-18 LAB
ALBUMIN SERPL-MCNC: 4.4 G/DL (ref 3.5–5.2)
ALBUMIN/GLOB SERPL: 1.8 G/DL
ALP SERPL-CCNC: 75 U/L (ref 39–117)
ALT SERPL-CCNC: 14 U/L (ref 1–33)
APPEARANCE UR: ABNORMAL
AST SERPL-CCNC: 18 U/L (ref 1–32)
BACTERIA #/AREA URNS HPF: ABNORMAL /HPF
BASOPHILS # BLD AUTO: 0.07 10*3/MM3 (ref 0–0.2)
BASOPHILS NFR BLD AUTO: 0.7 % (ref 0–1.5)
BILIRUB SERPL-MCNC: 0.4 MG/DL (ref 0–1.2)
BILIRUB UR QL STRIP: NEGATIVE
BUN SERPL-MCNC: 14 MG/DL (ref 8–23)
BUN/CREAT SERPL: 15.4 (ref 7–25)
CALCIUM SERPL-MCNC: 9.7 MG/DL (ref 8.6–10.5)
CASTS URNS MICRO: ABNORMAL
CHLORIDE SERPL-SCNC: 106 MMOL/L (ref 98–107)
CHOLEST SERPL-MCNC: 213 MG/DL (ref 0–200)
CO2 SERPL-SCNC: 25.9 MMOL/L (ref 22–29)
COLOR UR: YELLOW
CREAT SERPL-MCNC: 0.91 MG/DL (ref 0.57–1)
EGFRCR SERPLBLD CKD-EPI 2021: 66.8 ML/MIN/1.73
EOSINOPHIL # BLD AUTO: 0.11 10*3/MM3 (ref 0–0.4)
EOSINOPHIL NFR BLD AUTO: 1.1 % (ref 0.3–6.2)
EPI CELLS #/AREA URNS HPF: ABNORMAL /HPF
ERYTHROCYTE [DISTWIDTH] IN BLOOD BY AUTOMATED COUNT: 11.9 % (ref 12.3–15.4)
GLOBULIN SER CALC-MCNC: 2.4 GM/DL
GLUCOSE SERPL-MCNC: 104 MG/DL (ref 65–99)
GLUCOSE UR QL STRIP: NEGATIVE
HBA1C MFR BLD: 5.4 % (ref 4.8–5.6)
HCT VFR BLD AUTO: 41.6 % (ref 34–46.6)
HDLC SERPL-MCNC: 73 MG/DL (ref 40–60)
HGB BLD-MCNC: 14 G/DL (ref 12–15.9)
HGB UR QL STRIP: NEGATIVE
IMM GRANULOCYTES # BLD AUTO: 0.04 10*3/MM3 (ref 0–0.05)
IMM GRANULOCYTES NFR BLD AUTO: 0.4 % (ref 0–0.5)
KETONES UR QL STRIP: NEGATIVE
LDLC SERPL CALC-MCNC: 121 MG/DL (ref 0–100)
LDLC/HDLC SERPL: 1.63 {RATIO}
LEUKOCYTE ESTERASE UR QL STRIP: ABNORMAL
LYMPHOCYTES # BLD AUTO: 2.28 10*3/MM3 (ref 0.7–3.1)
LYMPHOCYTES NFR BLD AUTO: 23.8 % (ref 19.6–45.3)
MCH RBC QN AUTO: 31.5 PG (ref 26.6–33)
MCHC RBC AUTO-ENTMCNC: 33.7 G/DL (ref 31.5–35.7)
MCV RBC AUTO: 93.7 FL (ref 79–97)
MONOCYTES # BLD AUTO: 0.56 10*3/MM3 (ref 0.1–0.9)
MONOCYTES NFR BLD AUTO: 5.9 % (ref 5–12)
NEUTROPHILS # BLD AUTO: 6.51 10*3/MM3 (ref 1.7–7)
NEUTROPHILS NFR BLD AUTO: 68.1 % (ref 42.7–76)
NITRITE UR QL STRIP: NEGATIVE
NRBC BLD AUTO-RTO: 0 /100 WBC (ref 0–0.2)
PH UR STRIP: 5.5 [PH] (ref 5–8)
PLATELET # BLD AUTO: 215 10*3/MM3 (ref 140–450)
POTASSIUM SERPL-SCNC: 4.7 MMOL/L (ref 3.5–5.2)
PROT SERPL-MCNC: 6.8 G/DL (ref 6–8.5)
PROT UR QL STRIP: NEGATIVE
RBC # BLD AUTO: 4.44 10*6/MM3 (ref 3.77–5.28)
RBC #/AREA URNS HPF: ABNORMAL /HPF
SODIUM SERPL-SCNC: 142 MMOL/L (ref 136–145)
SP GR UR STRIP: 1.02 (ref 1–1.03)
TRIGL SERPL-MCNC: 106 MG/DL (ref 0–150)
UROBILINOGEN UR STRIP-MCNC: ABNORMAL MG/DL
VLDLC SERPL CALC-MCNC: 19 MG/DL (ref 5–40)
WBC # BLD AUTO: 9.57 10*3/MM3 (ref 3.4–10.8)
WBC #/AREA URNS HPF: ABNORMAL /HPF

## 2024-04-22 ENCOUNTER — OFFICE VISIT (OUTPATIENT)
Dept: FAMILY MEDICINE CLINIC | Facility: CLINIC | Age: 74
End: 2024-04-22
Payer: COMMERCIAL

## 2024-04-22 VITALS
HEIGHT: 66 IN | DIASTOLIC BLOOD PRESSURE: 78 MMHG | OXYGEN SATURATION: 98 % | RESPIRATION RATE: 15 BRPM | TEMPERATURE: 96.4 F | BODY MASS INDEX: 24.01 KG/M2 | HEART RATE: 63 BPM | SYSTOLIC BLOOD PRESSURE: 118 MMHG | WEIGHT: 149.4 LBS

## 2024-04-22 DIAGNOSIS — E78.00 HYPERCHOLESTEROLEMIA: ICD-10-CM

## 2024-04-22 DIAGNOSIS — E55.9 VITAMIN D DEFICIENCY: ICD-10-CM

## 2024-04-22 DIAGNOSIS — R73.9 HYPERGLYCEMIA: ICD-10-CM

## 2024-04-22 DIAGNOSIS — Z13.6 ENCOUNTER FOR SCREENING FOR VASCULAR DISEASE: Primary | ICD-10-CM

## 2024-04-22 PROCEDURE — 99213 OFFICE O/P EST LOW 20 MIN: CPT | Performed by: INTERNAL MEDICINE

## 2024-04-22 NOTE — PROGRESS NOTES
Subjective   Antonia Mendez is a 73 y.o. female. Patient is here today for   Chief Complaint   Patient presents with    Hyperlipidemia          Vitals:    04/22/24 1001   BP: 118/78   Pulse: 63   Resp: 15   Temp: 96.4 °F (35.8 °C)   SpO2: 98%     Body mass index is 24.13 kg/m².      Past Medical History:   Diagnosis Date    Essential tremor 9/26/2016    NAFLD (nonalcoholic fatty liver disease) 11/16/2017    10/2017 liver U/S    Tobacco abuse 9/26/2016      No Known Allergies   Social History     Socioeconomic History    Marital status:    Tobacco Use    Smoking status: Every Day     Types: Electronic Cigarette     Passive exposure: Current    Smokeless tobacco: Never    Tobacco comments:     Ecigarette   Substance and Sexual Activity    Alcohol use: Yes     Alcohol/week: 4.0 standard drinks of alcohol     Types: 4 Glasses of wine per week    Drug use: No    Sexual activity: Not Currently     Partners: Male     Birth control/protection: Post-menopausal        Current Outpatient Medications:     B Complex Vitamins (vitamin b complex) capsule capsule, Take  by mouth Daily., Disp: , Rfl:     doxycycline (VIBRAMYCIN) 100 MG capsule, Take 1 capsule by mouth Every Other Day., Disp: 90 capsule, Rfl: 3    Vitamin D, Cholecalciferol, (CHOLECALCIFEROL) 10 MCG (400 UNIT) tablet, Take 1 tablet by mouth Daily., Disp: , Rfl:      Objective     History of Present Illness  She is here to follow-up on hypercholesterolemia.    She feels well physically.  Emotionally she feels well.      Hyperlipidemia         Review of Systems   Constitutional: Negative.    HENT: Negative.     Respiratory: Negative.     Cardiovascular: Negative.    Gastrointestinal: Negative.    Genitourinary: Negative.    Musculoskeletal: Negative.    Neurological: Negative.    Psychiatric/Behavioral: Negative.         Physical Exam  Vitals and nursing note reviewed.   Constitutional:       Comments: Pleasant, neatly groomed, no distress.   Neurological:       Mental Status: She is oriented to person, place, and time.   Psychiatric:         Mood and Affect: Mood normal.         Behavior: Behavior normal.         Thought Content: Thought content normal.           Problems Addressed this Visit          Cardiac and Vasculature    Hypercholesterolemia       Endocrine and Metabolic    Vitamin D deficiency    Hyperglycemia     Other Visit Diagnoses       Encounter for screening for vascular disease    -  Primary    Relevant Orders    Vascular Screening (Bundle) CAR          Diagnoses         Codes Comments    Encounter for screening for vascular disease    -  Primary ICD-10-CM: Z13.6  ICD-9-CM: V81.2     Hypercholesterolemia     ICD-10-CM: E78.00  ICD-9-CM: 272.0     Vitamin D deficiency     ICD-10-CM: E55.9  ICD-9-CM: 268.9     Hyperglycemia     ICD-10-CM: R73.9  ICD-9-CM: 790.29               PLAN  I put an order in for vascular screening bundle which I would like her to get done to better stratify her risk.    Her cholesterol is too high and for the time being she is not interested in taking a cholesterol-lowering medication which is the reason I brought up vascular screening the last time I saw her.    She has vitamin D deficiency.  Encouraged her to take supplemental vitamin D over-the-counter.    Her fasting glucoses are elevated.  Her weight is appropriate.  I recommended that she get 30 minutes of brisk aerobic activity 5 days a week per American Heart Association guidelines.    She declined my arranging either DEXA or mammogram for her at this time.    I asked her to follow-up for an annual physical exam in 3 to 6 months.  Fasting labs prior to that visit should include: Lipid profile, comprehensive metabolic panel, CBC, urinalysis, hemoglobin A1c and vitamin D level.  No follow-ups on file.

## 2024-08-27 ENCOUNTER — HOSPITAL ENCOUNTER (OUTPATIENT)
Dept: CARDIOLOGY | Facility: HOSPITAL | Age: 74
Discharge: HOME | End: 2024-08-27
Admitting: INTERNAL MEDICINE

## 2024-08-27 VITALS
SYSTOLIC BLOOD PRESSURE: 121 MMHG | BODY MASS INDEX: 24.27 KG/M2 | HEIGHT: 66 IN | DIASTOLIC BLOOD PRESSURE: 69 MMHG | WEIGHT: 151 LBS | HEART RATE: 62 BPM

## 2024-08-27 DIAGNOSIS — Z13.6 ENCOUNTER FOR SCREENING FOR VASCULAR DISEASE: ICD-10-CM

## 2024-08-27 LAB
BH CV ECHO MEAS - DIST AO DIAM: 1.54 CM
BH CV VAS BP LEFT ARM: NORMAL MMHG
BH CV VAS BP RIGHT ARM: NORMAL MMHG
BH CV VAS SCREENING CAROTID CCA LEFT: NORMAL CM/SEC
BH CV VAS SCREENING CAROTID CCA RIGHT: NORMAL CM/SEC
BH CV VAS SCREENING CAROTID ICA LEFT: NORMAL CM/SEC
BH CV VAS SCREENING CAROTID ICA RIGHT: NORMAL CM/SEC
BH CV XLRA MEAS - MID AO DIAM: 1.73 CM
BH CV XLRA MEAS - PAD LEFT ABI DP: 1.1
BH CV XLRA MEAS - PAD LEFT ABI PT: 1.23
BH CV XLRA MEAS - PAD LEFT ARM: 121 MMHG
BH CV XLRA MEAS - PAD LEFT LEG DP: 134 MMHG
BH CV XLRA MEAS - PAD LEFT LEG PT: 149 MMHG
BH CV XLRA MEAS - PAD RIGHT ABI DP: 1.13
BH CV XLRA MEAS - PAD RIGHT ABI PT: 1.22
BH CV XLRA MEAS - PAD RIGHT ARM: 118 MMHG
BH CV XLRA MEAS - PAD RIGHT LEG DP: 137 MMHG
BH CV XLRA MEAS - PAD RIGHT LEG PT: 148 MMHG
BH CV XLRA MEAS - PROX AO DIAM: 2.25 CM
BH CV XLRA MEAS LEFT ICA/CCA RATIO: 1.16
BH CV XLRA MEAS LEFT PROX ECA PSV: NORMAL CM/SEC
BH CV XLRA MEAS RIGHT ICA/CCA RATIO: 1.54
BH CV XLRA MEAS RIGHT PROX ECA PSV: NORMAL CM/SEC
MAXIMAL PREDICTED HEART RATE: 146 BPM
STRESS TARGET HR: 124 BPM

## 2024-08-27 PROCEDURE — 93799 UNLISTED CV SVC/PROCEDURE: CPT

## 2024-10-15 ENCOUNTER — OFFICE VISIT (OUTPATIENT)
Dept: FAMILY MEDICINE CLINIC | Facility: CLINIC | Age: 74
End: 2024-10-15
Payer: COMMERCIAL

## 2024-10-15 VITALS
SYSTOLIC BLOOD PRESSURE: 124 MMHG | HEIGHT: 66 IN | RESPIRATION RATE: 15 BRPM | HEART RATE: 76 BPM | TEMPERATURE: 96.7 F | WEIGHT: 149 LBS | DIASTOLIC BLOOD PRESSURE: 70 MMHG | BODY MASS INDEX: 23.95 KG/M2 | OXYGEN SATURATION: 97 %

## 2024-10-15 DIAGNOSIS — E78.00 HYPERCHOLESTEROLEMIA: ICD-10-CM

## 2024-10-15 DIAGNOSIS — H93.13 TINNITUS OF BOTH EARS: Primary | ICD-10-CM

## 2024-10-15 DIAGNOSIS — F51.01 PRIMARY INSOMNIA: ICD-10-CM

## 2024-10-15 DIAGNOSIS — R42 VERTIGO: ICD-10-CM

## 2024-10-15 DIAGNOSIS — E55.9 VITAMIN D DEFICIENCY: ICD-10-CM

## 2024-10-15 DIAGNOSIS — R79.89 LOW VITAMIN D LEVEL: ICD-10-CM

## 2024-10-15 PROCEDURE — 99214 OFFICE O/P EST MOD 30 MIN: CPT | Performed by: INTERNAL MEDICINE

## 2024-10-15 RX ORDER — TRAZODONE HYDROCHLORIDE 50 MG/1
50 TABLET, FILM COATED ORAL NIGHTLY
Qty: 30 TABLET | Refills: 2 | Status: SHIPPED | OUTPATIENT
Start: 2024-10-15

## 2024-10-15 RX ORDER — TRIAZOLAM 0.25 MG
0.25 TABLET ORAL 2 TIMES DAILY PRN
Qty: 10 TABLET | Refills: 0 | Status: SHIPPED | OUTPATIENT
Start: 2024-10-15

## 2024-10-15 NOTE — PROGRESS NOTES
Subjective   Antonia Mendez is a 74 y.o. female. Patient is here today for   Chief Complaint   Patient presents with    Hyperlipidemia    Insomnia        Hyperlipidemia    Insomnia      History of Present Illness  The patient is a 74-year-old female who presents for evaluation of multiple medical concerns.    She is planning a trip to [facility] on 10/26/2023 and has expressed interest in obtaining preventative Paxlovid due to her fear of marie COVID-19. She has brought a COVID-19 test with her. Her current medication regimen includes only vitamins.    She reports experiencing stress, which she attributes to her sister's dementia and brother-in-law's personal care needs. This stress has been affecting her sleep for several years, with the past year being particularly challenging. Despite trying over-the-counter remedies like melatonin, Unisom, and Valerian root, she has not found significant relief. She typically sleeps for about 4 hours, and even with melatonin, she only manages an additional half hour of sleep. Her sleep is restless, and she does not experience REM sleep. She is considering taking trazodone or Valium for her sleep issues but is concerned about potential side effects. She also mentions that she will be flying to [facility] soon and would like to sleep during the flight.    She has been experiencing tinnitus, described as a whining, high-pitched sound in both ears, which she believes may be contributing to her sleep issues. The tinnitus used to be intermittent but has become constant over the last few years. She is considering seeing an ENT specialist for this issue. Additionally, she reports vertigo, which she believes is triggered when she lays on her right side.    Her cholesterol level is currently 236, which is higher than her previous reading. However, her LDL levels are slightly elevated and her ratio is within normal limits. She has undergone scans for her carotid artery, aortic aneurysm, and  peripheral arterial disease, all of which showed no significant blockage. She plans to continue losing weight and improving her diet to lower her carbohydrate intake. She has already lost weight, going from 165 pounds to her current weight of 150 pounds.    She has been prescribed vitamin D supplements but admits to not taking them consistently. She was previously prescribed a 10-day course of antibiotics for a urinary tract infection but did not fill the prescription. She reports no symptoms of a urinary tract infection. She also mentions some memory issues.    FAMILY HISTORY  Her sister has chronic tinnitus.    IMMUNIZATIONS  She had her COVID-19 vaccine and influenza vaccine. She got RSV vaccine last year. She is up-to-date on her shingles vaccine.      Vitals:    10/15/24 1010   BP: 124/70   Pulse: 76   Resp: 15   Temp: 96.7 °F (35.9 °C)   SpO2: 97%     Body mass index is 24.41 kg/m².    Past Medical History:   Diagnosis Date    Essential tremor 9/26/2016    NAFLD (nonalcoholic fatty liver disease) 11/16/2017    10/2017 liver U/S    Tobacco abuse 9/26/2016      No Known Allergies   Social History     Socioeconomic History    Marital status:    Tobacco Use    Smoking status: Every Day     Types: Electronic Cigarette     Passive exposure: Current    Smokeless tobacco: Never    Tobacco comments:     Ecigarette   Vaping Use    Vaping status: Every Day   Substance and Sexual Activity    Alcohol use: Yes     Alcohol/week: 4.0 standard drinks of alcohol     Types: 4 Glasses of wine per week    Drug use: No    Sexual activity: Not Currently     Partners: Male     Birth control/protection: Post-menopausal        Current Outpatient Medications:     B Complex Vitamins (vitamin b complex) capsule capsule, Take  by mouth Daily., Disp: , Rfl:     doxycycline (VIBRAMYCIN) 100 MG capsule, Take 1 capsule by mouth Every Other Day., Disp: 90 capsule, Rfl: 3    Doxylamine Succinate, Sleep, (UNISOM PO), Take  by mouth., Disp:  , Rfl:     MAGNESIUM PO, Take  by mouth., Disp: , Rfl:     MELATONIN PO, Take  by mouth., Disp: , Rfl:     Multiple Vitamins-Minerals (MULTI COMPLETE PO), Take  by mouth., Disp: , Rfl:     Vitamin D, Cholecalciferol, (CHOLECALCIFEROL) 10 MCG (400 UNIT) tablet, Take 1 tablet by mouth Daily., Disp: , Rfl:     Nirmatrelvir & Ritonavir, 300mg/100mg, (PAXLOVID), Take 3 tablets by mouth 2 (Two) Times a Day., Disp: 30 tablet, Rfl: 0    traZODone (DESYREL) 50 MG tablet, Take 1 tablet by mouth Every Night., Disp: 30 tablet, Rfl: 2    triazolam (Halcion) 0.25 MG tablet, Take 1 tablet by mouth 2 (Two) Times a Day As Needed for Sleep., Disp: 10 tablet, Rfl: 0     Objective     Review of Systems   Constitutional: Negative.    HENT:  Positive for tinnitus.         She notes bilateral tinnitus   Respiratory: Negative.     Cardiovascular: Negative.    Musculoskeletal: Negative.    Neurological:         She notes vertigo   Psychiatric/Behavioral:  Positive for sleep disturbance. The patient has insomnia.        Physical Exam  Vitals and nursing note reviewed.   Constitutional:       General: She is not in acute distress.     Appearance: Normal appearance. She is not ill-appearing, toxic-appearing or diaphoretic.      Comments: Pleasant, neatly groomed, no distress.   Neck:      Vascular: No carotid bruit.   Cardiovascular:      Rate and Rhythm: Regular rhythm.      Heart sounds: Normal heart sounds. No murmur heard.     No gallop.   Pulmonary:      Effort: No respiratory distress.      Breath sounds: Normal breath sounds. No wheezing or rales.   Neurological:      Mental Status: She is alert and oriented to person, place, and time.   Psychiatric:         Mood and Affect: Mood normal.         Behavior: Behavior normal.         Thought Content: Thought content normal.       Physical Exam  Lungs have a good sound.    Results  Laboratory Studies  Cholesterol is 236. Vitamin D level is low.    Assessment & Plan    Problems Addressed this  Visit          Cardiac and Vasculature    Hypercholesterolemia       ENT    Tinnitus of both ears - Primary    Relevant Orders    Ambulatory Referral to ENT (Otolaryngology)    Vertigo    Relevant Orders    Ambulatory Referral to ENT (Otolaryngology)       Endocrine and Metabolic    Vitamin D deficiency       Sleep    Primary insomnia       Symptoms and Signs    Low vitamin D level     Diagnoses         Codes Comments    Tinnitus of both ears    -  Primary ICD-10-CM: H93.13  ICD-9-CM: 388.30     Vertigo     ICD-10-CM: R42  ICD-9-CM: 780.4     Hypercholesterolemia     ICD-10-CM: E78.00  ICD-9-CM: 272.0     Vitamin D deficiency     ICD-10-CM: E55.9  ICD-9-CM: 268.9     Low vitamin D level     ICD-10-CM: R79.89  ICD-9-CM: 790.6     Primary insomnia     ICD-10-CM: F51.01  ICD-9-CM: 307.42           Assessment & Plan  1. COVID-19 prevention.  A prescription for Paxlovid was provided to be filled at [facility] pharmacy. She will carry a COVID test with her during her trip to [facility]. She has received her COVID and flu shots.    2. Insomnia.  Trazodone was recommended for use as needed. A prescription for Halcion was also provided for use during her flight to [facility]. She was advised not to take Halcion and Paxlovid simultaneously.     3. Tinnitus.  A referral to [facility] was made for evaluation of tinnitus.    4. Vertigo.  A referral to [facility] was made for evaluation of vertigo.    5. Hypercholesterolemia.  Continuation of weight loss efforts and a healthy diet were advised. She was informed that her cholesterol levels are high.  She does have carotid artery plaque which is not said to be significant.  She declines to start taking a cholesterol-lowering medication.    6. Vitamin D deficiency.  Regular intake of vitamin D pills was recommended due to low vitamin D levels.  I asked her to take vitamin D over-the-counter 5000 IUs once daily.    7. Health maintenance.  A bone density scan and mammogram were  scheduled for 04/2024.          No follow-ups on file.    Patient or patient representative verbalized consent for the use of Ambient Listening during the visit with  Fracisco Barnard MD for chart documentation. 10/15/2024  13:50 EDT

## 2024-12-04 ENCOUNTER — DOCUMENTATION (OUTPATIENT)
Dept: FAMILY MEDICINE CLINIC | Facility: CLINIC | Age: 74
End: 2024-12-04
Payer: COMMERCIAL

## 2025-04-21 DIAGNOSIS — F51.01 PRIMARY INSOMNIA: ICD-10-CM

## 2025-04-21 DIAGNOSIS — E78.00 HYPERCHOLESTEROLEMIA: ICD-10-CM

## 2025-04-21 DIAGNOSIS — R79.89 LOW VITAMIN D LEVEL: ICD-10-CM

## 2025-04-21 DIAGNOSIS — E55.9 VITAMIN D DEFICIENCY: Primary | ICD-10-CM

## 2025-04-21 DIAGNOSIS — R73.9 HYPERGLYCEMIA: ICD-10-CM

## 2025-04-24 LAB
25(OH)D3+25(OH)D2 SERPL-MCNC: 33.9 NG/ML (ref 30–100)
ALBUMIN SERPL-MCNC: 4.5 G/DL (ref 3.5–5.2)
ALBUMIN/GLOB SERPL: 1.7 G/DL
ALP SERPL-CCNC: 69 U/L (ref 39–117)
ALT SERPL-CCNC: 15 U/L (ref 1–33)
APPEARANCE UR: CLEAR
AST SERPL-CCNC: 21 U/L (ref 1–32)
BASOPHILS # BLD AUTO: 0.07 10*3/MM3 (ref 0–0.2)
BASOPHILS NFR BLD AUTO: 0.8 % (ref 0–1.5)
BILIRUB SERPL-MCNC: 0.4 MG/DL (ref 0–1.2)
BILIRUB UR QL STRIP: NEGATIVE
BUN SERPL-MCNC: 16 MG/DL (ref 8–23)
BUN/CREAT SERPL: 19.3 (ref 7–25)
CALCIUM SERPL-MCNC: 9.7 MG/DL (ref 8.6–10.5)
CHLORIDE SERPL-SCNC: 108 MMOL/L (ref 98–107)
CHOLEST SERPL-MCNC: 225 MG/DL (ref 0–200)
CO2 SERPL-SCNC: 24.6 MMOL/L (ref 22–29)
COLOR UR: YELLOW
CREAT SERPL-MCNC: 0.83 MG/DL (ref 0.57–1)
EGFRCR SERPLBLD CKD-EPI 2021: 74.1 ML/MIN/1.73
EOSINOPHIL # BLD AUTO: 0.16 10*3/MM3 (ref 0–0.4)
EOSINOPHIL NFR BLD AUTO: 1.9 % (ref 0.3–6.2)
ERYTHROCYTE [DISTWIDTH] IN BLOOD BY AUTOMATED COUNT: 11.8 % (ref 12.3–15.4)
GLOBULIN SER CALC-MCNC: 2.6 GM/DL
GLUCOSE SERPL-MCNC: 112 MG/DL (ref 65–99)
GLUCOSE UR QL STRIP: NEGATIVE
HBA1C MFR BLD: 5.5 % (ref 4.8–5.6)
HCT VFR BLD AUTO: 41.9 % (ref 34–46.6)
HDLC SERPL-MCNC: 83 MG/DL (ref 40–60)
HGB BLD-MCNC: 14.4 G/DL (ref 12–15.9)
HGB UR QL STRIP: NEGATIVE
IMM GRANULOCYTES # BLD AUTO: 0.02 10*3/MM3 (ref 0–0.05)
IMM GRANULOCYTES NFR BLD AUTO: 0.2 % (ref 0–0.5)
KETONES UR QL STRIP: NEGATIVE
LDLC SERPL CALC-MCNC: 125 MG/DL (ref 0–100)
LDLC/HDLC SERPL: 1.48 {RATIO}
LEUKOCYTE ESTERASE UR QL STRIP: NEGATIVE
LYMPHOCYTES # BLD AUTO: 2.39 10*3/MM3 (ref 0.7–3.1)
LYMPHOCYTES NFR BLD AUTO: 28.4 % (ref 19.6–45.3)
MCH RBC QN AUTO: 32.1 PG (ref 26.6–33)
MCHC RBC AUTO-ENTMCNC: 34.4 G/DL (ref 31.5–35.7)
MCV RBC AUTO: 93.3 FL (ref 79–97)
MONOCYTES # BLD AUTO: 0.57 10*3/MM3 (ref 0.1–0.9)
MONOCYTES NFR BLD AUTO: 6.8 % (ref 5–12)
NEUTROPHILS # BLD AUTO: 5.21 10*3/MM3 (ref 1.7–7)
NEUTROPHILS NFR BLD AUTO: 61.9 % (ref 42.7–76)
NITRITE UR QL STRIP: NEGATIVE
NRBC BLD AUTO-RTO: 0 /100 WBC (ref 0–0.2)
PH UR STRIP: 5.5 [PH] (ref 5–8)
PLATELET # BLD AUTO: 213 10*3/MM3 (ref 140–450)
POTASSIUM SERPL-SCNC: 5.1 MMOL/L (ref 3.5–5.2)
PROT SERPL-MCNC: 7.1 G/DL (ref 6–8.5)
PROT UR QL STRIP: NEGATIVE
RBC # BLD AUTO: 4.49 10*6/MM3 (ref 3.77–5.28)
SODIUM SERPL-SCNC: 143 MMOL/L (ref 136–145)
SP GR UR STRIP: 1.02 (ref 1–1.03)
TRIGL SERPL-MCNC: 96 MG/DL (ref 0–150)
UROBILINOGEN UR STRIP-MCNC: NORMAL MG/DL
VLDLC SERPL CALC-MCNC: 17 MG/DL (ref 5–40)
WBC # BLD AUTO: 8.42 10*3/MM3 (ref 3.4–10.8)

## 2025-04-30 ENCOUNTER — OFFICE VISIT (OUTPATIENT)
Dept: FAMILY MEDICINE CLINIC | Facility: CLINIC | Age: 75
End: 2025-04-30
Payer: COMMERCIAL

## 2025-04-30 VITALS
TEMPERATURE: 96.5 F | HEART RATE: 60 BPM | WEIGHT: 154.5 LBS | RESPIRATION RATE: 15 BRPM | DIASTOLIC BLOOD PRESSURE: 80 MMHG | BODY MASS INDEX: 24.83 KG/M2 | OXYGEN SATURATION: 98 % | SYSTOLIC BLOOD PRESSURE: 120 MMHG | HEIGHT: 66 IN

## 2025-04-30 DIAGNOSIS — R73.9 HYPERGLYCEMIA: ICD-10-CM

## 2025-04-30 DIAGNOSIS — Z12.31 ENCOUNTER FOR SCREENING MAMMOGRAM FOR MALIGNANT NEOPLASM OF BREAST: ICD-10-CM

## 2025-04-30 DIAGNOSIS — E78.00 HYPERCHOLESTEROLEMIA: Primary | ICD-10-CM

## 2025-04-30 DIAGNOSIS — Z78.0 POSTMENOPAUSE: ICD-10-CM

## 2025-05-02 PROBLEM — Z12.31 ENCOUNTER FOR SCREENING MAMMOGRAM FOR MALIGNANT NEOPLASM OF BREAST: Status: ACTIVE | Noted: 2025-05-02

## 2025-05-02 PROBLEM — Z78.0 POSTMENOPAUSE: Status: ACTIVE | Noted: 2025-05-02

## 2025-05-02 NOTE — PROGRESS NOTES
Subjective   Antonia Mendez is a 74 y.o. female. Patient is here today for   Chief Complaint   Patient presents with    Hyperlipidemia          Vitals:    04/30/25 1016   BP: 120/80   Pulse: 60   Resp: 15   Temp: 96.5 °F (35.8 °C)   SpO2: 98%     Body mass index is 25.31 kg/m².      Past Medical History:   Diagnosis Date    Essential tremor 9/26/2016    NAFLD (nonalcoholic fatty liver disease) 11/16/2017    10/2017 liver U/S    Tobacco abuse 9/26/2016      No Known Allergies   Social History     Socioeconomic History    Marital status:    Tobacco Use    Smoking status: Every Day     Types: Electronic Cigarette     Passive exposure: Current    Smokeless tobacco: Never    Tobacco comments:     Ecigarette   Vaping Use    Vaping status: Every Day   Substance and Sexual Activity    Alcohol use: Yes     Alcohol/week: 4.0 standard drinks of alcohol     Types: 4 Glasses of wine per week    Drug use: No    Sexual activity: Not Currently     Partners: Male     Birth control/protection: Post-menopausal        Current Outpatient Medications:     B Complex Vitamins (vitamin b complex) capsule capsule, Take  by mouth Daily., Disp: , Rfl:     doxycycline (VIBRAMYCIN) 100 MG capsule, Take 1 capsule by mouth Every Other Day., Disp: 90 capsule, Rfl: 3    MAGNESIUM PO, Take  by mouth., Disp: , Rfl:     Multiple Vitamins-Minerals (MULTI COMPLETE PO), Take  by mouth., Disp: , Rfl:     Vitamin D, Cholecalciferol, (CHOLECALCIFEROL) 10 MCG (400 UNIT) tablet, Take 1 tablet by mouth Daily., Disp: , Rfl:      Objective     History of Present Illness  She is here to follow-up on labs done last week.    She is overdue for bone density and a DEXA scan.  Hyperlipidemia         Review of Systems   Constitutional: Negative.    HENT: Negative.     Respiratory: Negative.     Cardiovascular: Negative.    Musculoskeletal: Negative.    Psychiatric/Behavioral: Negative.         Physical Exam  Vitals and nursing note reviewed.   Constitutional:        General: She is not in acute distress.     Appearance: Normal appearance. She is normal weight. She is not ill-appearing or diaphoretic.   Neck:      Vascular: No carotid bruit.   Cardiovascular:      Rate and Rhythm: Regular rhythm.      Heart sounds: Normal heart sounds. No murmur heard.     No gallop.   Pulmonary:      Effort: No respiratory distress.      Breath sounds: Normal breath sounds. No wheezing or rales.   Neurological:      Mental Status: She is alert.   Psychiatric:         Mood and Affect: Mood normal.         Behavior: Behavior normal.         Thought Content: Thought content normal.           Problems Addressed this Visit          Cardiac and Vasculature    Hypercholesterolemia - Primary       Endocrine and Metabolic    Hyperglycemia       Genitourinary and Reproductive     Encounter for screening mammogram for malignant neoplasm of breast    Relevant Orders    Mammo Screening Digital Tomosynthesis Bilateral With CAD    Postmenopause    Relevant Orders    DEXA Bone Density Axial     Diagnoses         Codes Comments      Hypercholesterolemia    -  Primary ICD-10-CM: E78.00  ICD-9-CM: 272.0       Encounter for screening mammogram for malignant neoplasm of breast     ICD-10-CM: Z12.31  ICD-9-CM: V76.12       Postmenopause     ICD-10-CM: Z78.0  ICD-9-CM: V49.81       Hyperglycemia     ICD-10-CM: R73.9  ICD-9-CM: 790.29               PLAN  She has hypercholesterolemia.  She will continue to work on the most appropriate diet to decrease her LDL cholesterol.  She is not interested in taking medication to manage her hypercholesterolemia.    She has hyperglycemia.  Her hemoglobin A1c is 5.5.  Her fasting glucose is 112 last week.  We will continue to monitor this.    I put an order in for her to get a bone density and for her to get a mammogram.    I asked her to follow-up for an annual physical exam in about 4 months.  Fasting labs prior to that visit should include: Lipid profile, comprehensive metabolic  panel, CBC, urinalysis.  No follow-ups on file.

## 2025-06-25 ENCOUNTER — HOSPITAL ENCOUNTER (OUTPATIENT)
Dept: MAMMOGRAPHY | Facility: HOSPITAL | Age: 75
Discharge: HOME | End: 2025-06-25
Payer: COMMERCIAL

## 2025-06-25 ENCOUNTER — HOSPITAL ENCOUNTER (OUTPATIENT)
Dept: BONE DENSITY | Facility: HOSPITAL | Age: 75
Discharge: HOME | End: 2025-06-25
Payer: COMMERCIAL

## 2025-06-25 DIAGNOSIS — Z12.31 ENCOUNTER FOR SCREENING MAMMOGRAM FOR MALIGNANT NEOPLASM OF BREAST: ICD-10-CM

## 2025-06-25 DIAGNOSIS — Z78.0 POSTMENOPAUSE: ICD-10-CM

## 2025-06-25 PROCEDURE — 77067 SCR MAMMO BI INCL CAD: CPT

## 2025-06-25 PROCEDURE — 77080 DXA BONE DENSITY AXIAL: CPT

## 2025-06-25 PROCEDURE — 77063 BREAST TOMOSYNTHESIS BI: CPT
